# Patient Record
Sex: MALE | Race: WHITE | Employment: OTHER | URBAN - METROPOLITAN AREA
[De-identification: names, ages, dates, MRNs, and addresses within clinical notes are randomized per-mention and may not be internally consistent; named-entity substitution may affect disease eponyms.]

---

## 2020-09-24 RX ORDER — METOPROLOL SUCCINATE 25 MG/1
25 TABLET, EXTENDED RELEASE ORAL 2 TIMES DAILY
COMMUNITY

## 2020-09-24 RX ORDER — ASPIRIN 81 MG/1
81 TABLET ORAL DAILY
Status: ON HOLD | COMMUNITY
End: 2020-10-21 | Stop reason: SDUPTHER

## 2020-09-24 RX ORDER — ESCITALOPRAM OXALATE 20 MG/1
20 TABLET ORAL DAILY
COMMUNITY

## 2020-09-24 RX ORDER — ISOSORBIDE MONONITRATE 60 MG/1
60 TABLET, EXTENDED RELEASE ORAL DAILY
COMMUNITY

## 2020-09-24 RX ORDER — PRAVASTATIN SODIUM 80 MG/1
80 TABLET ORAL NIGHTLY
COMMUNITY

## 2020-09-24 RX ORDER — GABAPENTIN 600 MG/1
600 TABLET ORAL 3 TIMES DAILY
COMMUNITY

## 2020-09-24 NOTE — PROGRESS NOTES
Name_______________________________________Printed:____________________  Date and time of surgery__10/20  1145______________________Arrival Time:__0945______________   1. The instructions given regarding when and if a patient needs to stop oral intake prior to surgery varies. Follow the specific instructions you were given                  __x_Nothing to eat or to drink after Midnight the night before.                             ____Endoscopy patient follow your DRS instructions-generally you will be doing a part of the prep after Midnight                   ____Carbo loading or ERAS instructions will be given to select patients-if you have been given those instructions -please do the following                           The evening before your surgery after dinner before midnight drink 40 ounces of gatorade. If you are diabetic use sugar free. The morning of surgery drink 40 ounces of water. This needs to be finished 3 hours prior to your surgery start time. 2. Take the following pills with a small sip of water on the morning of surgery____imdur metoprolol gabapentin__ lexapro_____________________________________________                  Do not take blood pressure medications ending in pril or sartan the maritza prior to surgery or the morning of surgery_   3. Aspirin, Ibuprofen, Advil, Naproxen, Vitamin E and other Anti-inflammatory products and supplements should be stopped for 5 -7days before surgery or as directed by your physician. 4. Check with your Doctor regarding stopping Plavix, Coumadin,Eliquis, Lovenox,Effient,Pradaxa,Xarelto, Fragmin or other blood thinners and follow their instructions. 5. Do not smoke, and do not drink any alcoholic beverages 24 hours prior to surgery. This includes NA Beer. Refrain from the usage of any recreational drugs. 6. You may brush your teeth and gargle the morning of surgery. DO NOT SWALLOW WATER   7.  You MUST make arrangements for a responsible adult to stay on site plans are while the patient is in the facility. At the MAIN there is one visitor allowed. Please note that the visitor policy is subject to change._____________________________________   *Please call pre admission testing if you any further questions   [address]    [phone]  [phone]   [address] [place] [address]       All above information reviewed with patient in person or by phone. Patient verbalizes understanding. All questions and concerns addressed.                                                                                                  Patient/Rep____________________                                                                                                                Per phone/pt                    PRE OP INSTRUCTIONS

## 2020-10-14 ENCOUNTER — HOSPITAL ENCOUNTER (OUTPATIENT)
Age: 80
Discharge: HOME | End: 2020-10-14
Payer: MEDICARE

## 2020-10-14 ENCOUNTER — OFFICE VISIT (OUTPATIENT)
Dept: PRIMARY CARE CLINIC | Age: 80
End: 2020-10-14
Payer: MEDICARE

## 2020-10-14 LAB
ABO/RH: NORMAL
ANTIBODY SCREEN: NORMAL
APTT: 34.4 SEC (ref 24.2–36.2)
HCT VFR BLD CALC: 40.7 % (ref 40.5–52.5)
HEMOGLOBIN: 13.9 G/DL (ref 13.5–17.5)
INR BLD: 1.1 (ref 0.86–1.14)
MCH RBC QN AUTO: 30.8 PG (ref 26–34)
MCHC RBC AUTO-ENTMCNC: 34 G/DL (ref 31–36)
MCV RBC AUTO: 90.7 FL (ref 80–100)
PDW BLD-RTO: 13.7 % (ref 12.4–15.4)
PLATELET # BLD: 226 K/UL (ref 135–450)
PMV BLD AUTO: 7.2 FL (ref 5–10.5)
PROTHROMBIN TIME: 12.8 SEC (ref 10–13.2)
RBC # BLD: 4.49 M/UL (ref 4.2–5.9)
WBC # BLD: 6.9 K/UL (ref 4–11)

## 2020-10-14 PROCEDURE — 85027 COMPLETE CBC AUTOMATED: CPT

## 2020-10-14 PROCEDURE — 86850 RBC ANTIBODY SCREEN: CPT

## 2020-10-14 PROCEDURE — 36415 COLL VENOUS BLD VENIPUNCTURE: CPT

## 2020-10-14 PROCEDURE — 85610 PROTHROMBIN TIME: CPT

## 2020-10-14 PROCEDURE — G8428 CUR MEDS NOT DOCUMENT: HCPCS | Performed by: NURSE PRACTITIONER

## 2020-10-14 PROCEDURE — 86900 BLOOD TYPING SEROLOGIC ABO: CPT

## 2020-10-14 PROCEDURE — 85730 THROMBOPLASTIN TIME PARTIAL: CPT

## 2020-10-14 PROCEDURE — 86901 BLOOD TYPING SEROLOGIC RH(D): CPT

## 2020-10-14 PROCEDURE — 99211 OFF/OP EST MAY X REQ PHY/QHP: CPT | Performed by: NURSE PRACTITIONER

## 2020-10-14 PROCEDURE — G8419 CALC BMI OUT NRM PARAM NOF/U: HCPCS | Performed by: NURSE PRACTITIONER

## 2020-10-14 NOTE — PATIENT INSTRUCTIONS

## 2020-10-14 NOTE — PROGRESS NOTES
Pt called to state that he fell and was seen in ER and now has arm fracture. Sheyla Cunningham at Dr. Sergei Spencer office notified and will let him know and get back with pt. No further orders given.

## 2020-10-14 NOTE — PROGRESS NOTES
Daniel Milian received a viral test for COVID-19. They were educated on isolation and quarantine as appropriate. For any symptoms, they were directed to seek care from their PCP, given contact information to establish with a doctor, directed to an urgent care or the emergency room.

## 2020-10-15 LAB — SARS-COV-2, NAA: NOT DETECTED

## 2020-10-15 NOTE — PROGRESS NOTES
Message left with wife for new arrival time of 0730. [facility] called multiple times to extract ekg, but thus far unsuccessful as of the time of this writing. Dr. Julian Montaño made aware and would like ekg dos if ekg does not arrive. No further orders given.

## 2020-10-15 NOTE — RESULT ENCOUNTER NOTE

## 2020-10-20 ENCOUNTER — ANESTHESIA (OUTPATIENT)
Dept: OPERATING ROOM | Age: 80
End: 2020-10-20
Payer: MEDICARE

## 2020-10-20 ENCOUNTER — HOSPITAL ENCOUNTER (OUTPATIENT)
Age: 80
Setting detail: OBSERVATION
Discharge: HOME | End: 2020-10-22
Attending: NEUROLOGICAL SURGERY | Admitting: NEUROLOGICAL SURGERY
Payer: MEDICARE

## 2020-10-20 ENCOUNTER — ANESTHESIA EVENT (OUTPATIENT)
Dept: OPERATING ROOM | Age: 80
End: 2020-10-20
Payer: MEDICARE

## 2020-10-20 ENCOUNTER — APPOINTMENT (OUTPATIENT)
Dept: GENERAL RADIOLOGY | Age: 80
End: 2020-10-20
Attending: NEUROLOGICAL SURGERY
Payer: MEDICARE

## 2020-10-20 VITALS
OXYGEN SATURATION: 100 % | SYSTOLIC BLOOD PRESSURE: 132 MMHG | TEMPERATURE: 96.1 F | RESPIRATION RATE: 12 BRPM | DIASTOLIC BLOOD PRESSURE: 63 MMHG

## 2020-10-20 PROBLEM — K21.9 GERD (GASTROESOPHAGEAL REFLUX DISEASE): Status: ACTIVE | Noted: 2020-10-20

## 2020-10-20 PROBLEM — F32.A DEPRESSION: Status: ACTIVE | Noted: 2020-10-20

## 2020-10-20 PROBLEM — M48.04 SPINAL STENOSIS, THORACIC REGION: Status: ACTIVE | Noted: 2020-10-20

## 2020-10-20 PROBLEM — E78.00 HIGH CHOLESTEROL: Status: ACTIVE | Noted: 2020-10-20

## 2020-10-20 LAB
ABO/RH: NORMAL
ANTIBODY SCREEN: NORMAL

## 2020-10-20 PROCEDURE — 3700000000 HC ANESTHESIA ATTENDED CARE: Performed by: NEUROLOGICAL SURGERY

## 2020-10-20 PROCEDURE — 36415 COLL VENOUS BLD VENIPUNCTURE: CPT

## 2020-10-20 PROCEDURE — 72020 X-RAY EXAM OF SPINE 1 VIEW: CPT

## 2020-10-20 PROCEDURE — 1200000000 HC SEMI PRIVATE

## 2020-10-20 PROCEDURE — 3600000004 HC SURGERY LEVEL 4 BASE: Performed by: NEUROLOGICAL SURGERY

## 2020-10-20 PROCEDURE — 6360000002 HC RX W HCPCS: Performed by: NEUROLOGICAL SURGERY

## 2020-10-20 PROCEDURE — 94761 N-INVAS EAR/PLS OXIMETRY MLT: CPT

## 2020-10-20 PROCEDURE — 3600000014 HC SURGERY LEVEL 4 ADDTL 15MIN: Performed by: NEUROLOGICAL SURGERY

## 2020-10-20 PROCEDURE — 2580000003 HC RX 258: Performed by: NURSE ANESTHETIST, CERTIFIED REGISTERED

## 2020-10-20 PROCEDURE — 7100000000 HC PACU RECOVERY - FIRST 15 MIN: Performed by: NEUROLOGICAL SURGERY

## 2020-10-20 PROCEDURE — 7100000001 HC PACU RECOVERY - ADDTL 15 MIN: Performed by: NEUROLOGICAL SURGERY

## 2020-10-20 PROCEDURE — 3700000001 HC ADD 15 MINUTES (ANESTHESIA): Performed by: NEUROLOGICAL SURGERY

## 2020-10-20 PROCEDURE — 6360000002 HC RX W HCPCS: Performed by: NURSE ANESTHETIST, CERTIFIED REGISTERED

## 2020-10-20 PROCEDURE — 2720000010 HC SURG SUPPLY STERILE: Performed by: NEUROLOGICAL SURGERY

## 2020-10-20 PROCEDURE — 6370000000 HC RX 637 (ALT 250 FOR IP): Performed by: NEUROLOGICAL SURGERY

## 2020-10-20 PROCEDURE — 94150 VITAL CAPACITY TEST: CPT

## 2020-10-20 PROCEDURE — G0378 HOSPITAL OBSERVATION PER HR: HCPCS

## 2020-10-20 PROCEDURE — 2500000003 HC RX 250 WO HCPCS: Performed by: NEUROLOGICAL SURGERY

## 2020-10-20 PROCEDURE — 86850 RBC ANTIBODY SCREEN: CPT

## 2020-10-20 PROCEDURE — 2700000000 HC OXYGEN THERAPY PER DAY

## 2020-10-20 PROCEDURE — 86901 BLOOD TYPING SEROLOGIC RH(D): CPT

## 2020-10-20 PROCEDURE — 2709999900 HC NON-CHARGEABLE SUPPLY: Performed by: NEUROLOGICAL SURGERY

## 2020-10-20 PROCEDURE — 2580000003 HC RX 258: Performed by: NEUROLOGICAL SURGERY

## 2020-10-20 PROCEDURE — 86900 BLOOD TYPING SEROLOGIC ABO: CPT

## 2020-10-20 PROCEDURE — 2500000003 HC RX 250 WO HCPCS: Performed by: NURSE ANESTHETIST, CERTIFIED REGISTERED

## 2020-10-20 PROCEDURE — 6370000000 HC RX 637 (ALT 250 FOR IP): Performed by: ANESTHESIOLOGY

## 2020-10-20 RX ORDER — GABAPENTIN 300 MG/1
600 CAPSULE ORAL 3 TIMES DAILY
Status: DISCONTINUED | OUTPATIENT
Start: 2020-10-20 | End: 2020-10-22 | Stop reason: HOSPADM

## 2020-10-20 RX ORDER — HYDROMORPHONE HCL 110MG/55ML
0.5 PATIENT CONTROLLED ANALGESIA SYRINGE INTRAVENOUS EVERY 5 MIN PRN
Status: DISCONTINUED | OUTPATIENT
Start: 2020-10-20 | End: 2020-10-20 | Stop reason: HOSPADM

## 2020-10-20 RX ORDER — DEXAMETHASONE SODIUM PHOSPHATE 4 MG/ML
INJECTION, SOLUTION INTRA-ARTICULAR; INTRALESIONAL; INTRAMUSCULAR; INTRAVENOUS; SOFT TISSUE PRN
Status: DISCONTINUED | OUTPATIENT
Start: 2020-10-20 | End: 2020-10-20 | Stop reason: SDUPTHER

## 2020-10-20 RX ORDER — ONDANSETRON 2 MG/ML
4 INJECTION INTRAMUSCULAR; INTRAVENOUS EVERY 6 HOURS PRN
Status: DISCONTINUED | OUTPATIENT
Start: 2020-10-20 | End: 2020-10-22 | Stop reason: HOSPADM

## 2020-10-20 RX ORDER — HYDROCODONE BITARTRATE AND ACETAMINOPHEN 5; 325 MG/1; MG/1
1 TABLET ORAL
Status: DISCONTINUED | OUTPATIENT
Start: 2020-10-20 | End: 2020-10-20 | Stop reason: HOSPADM

## 2020-10-20 RX ORDER — METOPROLOL SUCCINATE 25 MG/1
25 TABLET, EXTENDED RELEASE ORAL 2 TIMES DAILY
Status: DISCONTINUED | OUTPATIENT
Start: 2020-10-20 | End: 2020-10-22 | Stop reason: HOSPADM

## 2020-10-20 RX ORDER — LIDOCAINE HYDROCHLORIDE 10 MG/ML
0.5 INJECTION, SOLUTION EPIDURAL; INFILTRATION; INTRACAUDAL; PERINEURAL ONCE
Status: DISCONTINUED | OUTPATIENT
Start: 2020-10-20 | End: 2020-10-20 | Stop reason: HOSPADM

## 2020-10-20 RX ORDER — PRAVASTATIN SODIUM 80 MG/1
80 TABLET ORAL NIGHTLY
Status: DISCONTINUED | OUTPATIENT
Start: 2020-10-20 | End: 2020-10-22 | Stop reason: HOSPADM

## 2020-10-20 RX ORDER — MAGNESIUM HYDROXIDE 1200 MG/15ML
LIQUID ORAL CONTINUOUS PRN
Status: COMPLETED | OUTPATIENT
Start: 2020-10-20 | End: 2020-10-20

## 2020-10-20 RX ORDER — PROMETHAZINE HYDROCHLORIDE 25 MG/1
12.5 TABLET ORAL EVERY 6 HOURS PRN
Status: DISCONTINUED | OUTPATIENT
Start: 2020-10-20 | End: 2020-10-22 | Stop reason: HOSPADM

## 2020-10-20 RX ORDER — ISOSORBIDE MONONITRATE 30 MG/1
60 TABLET, EXTENDED RELEASE ORAL DAILY
Status: DISCONTINUED | OUTPATIENT
Start: 2020-10-20 | End: 2020-10-22 | Stop reason: HOSPADM

## 2020-10-20 RX ORDER — 0.9 % SODIUM CHLORIDE 0.9 %
500 INTRAVENOUS SOLUTION INTRAVENOUS PRN
Status: DISCONTINUED | OUTPATIENT
Start: 2020-10-20 | End: 2020-10-22 | Stop reason: HOSPADM

## 2020-10-20 RX ORDER — PHENYLEPHRINE HCL IN 0.9% NACL 1 MG/10 ML
SYRINGE (ML) INTRAVENOUS PRN
Status: DISCONTINUED | OUTPATIENT
Start: 2020-10-20 | End: 2020-10-20 | Stop reason: SDUPTHER

## 2020-10-20 RX ORDER — SODIUM CHLORIDE, SODIUM LACTATE, POTASSIUM CHLORIDE, CALCIUM CHLORIDE 600; 310; 30; 20 MG/100ML; MG/100ML; MG/100ML; MG/100ML
INJECTION, SOLUTION INTRAVENOUS CONTINUOUS PRN
Status: DISCONTINUED | OUTPATIENT
Start: 2020-10-20 | End: 2020-10-20 | Stop reason: SDUPTHER

## 2020-10-20 RX ORDER — HYDROMORPHONE HCL 110MG/55ML
0.25 PATIENT CONTROLLED ANALGESIA SYRINGE INTRAVENOUS EVERY 5 MIN PRN
Status: DISCONTINUED | OUTPATIENT
Start: 2020-10-20 | End: 2020-10-20 | Stop reason: HOSPADM

## 2020-10-20 RX ORDER — OXYCODONE HYDROCHLORIDE 5 MG/1
10 TABLET ORAL EVERY 4 HOURS PRN
Status: DISCONTINUED | OUTPATIENT
Start: 2020-10-20 | End: 2020-10-22 | Stop reason: HOSPADM

## 2020-10-20 RX ORDER — LIDOCAINE HYDROCHLORIDE 20 MG/ML
INJECTION, SOLUTION EPIDURAL; INFILTRATION; INTRACAUDAL; PERINEURAL PRN
Status: DISCONTINUED | OUTPATIENT
Start: 2020-10-20 | End: 2020-10-20 | Stop reason: SDUPTHER

## 2020-10-20 RX ORDER — HYDROMORPHONE HYDROCHLORIDE 1 MG/ML
0.5 INJECTION, SOLUTION INTRAMUSCULAR; INTRAVENOUS; SUBCUTANEOUS
Status: DISCONTINUED | OUTPATIENT
Start: 2020-10-20 | End: 2020-10-22 | Stop reason: HOSPADM

## 2020-10-20 RX ORDER — SODIUM CHLORIDE, SODIUM LACTATE, POTASSIUM CHLORIDE, CALCIUM CHLORIDE 600; 310; 30; 20 MG/100ML; MG/100ML; MG/100ML; MG/100ML
INJECTION, SOLUTION INTRAVENOUS CONTINUOUS
Status: DISCONTINUED | OUTPATIENT
Start: 2020-10-20 | End: 2020-10-20

## 2020-10-20 RX ORDER — MAGNESIUM SULFATE HEPTAHYDRATE 500 MG/ML
INJECTION, SOLUTION INTRAMUSCULAR; INTRAVENOUS PRN
Status: DISCONTINUED | OUTPATIENT
Start: 2020-10-20 | End: 2020-10-20 | Stop reason: SDUPTHER

## 2020-10-20 RX ORDER — METHOCARBAMOL 750 MG/1
750 TABLET, FILM COATED ORAL EVERY 8 HOURS PRN
Status: DISCONTINUED | OUTPATIENT
Start: 2020-10-20 | End: 2020-10-22 | Stop reason: HOSPADM

## 2020-10-20 RX ORDER — SODIUM CHLORIDE 9 MG/ML
INJECTION, SOLUTION INTRAVENOUS CONTINUOUS
Status: DISCONTINUED | OUTPATIENT
Start: 2020-10-20 | End: 2020-10-20

## 2020-10-20 RX ORDER — PROPOFOL 10 MG/ML
INJECTION, EMULSION INTRAVENOUS CONTINUOUS PRN
Status: DISCONTINUED | OUTPATIENT
Start: 2020-10-20 | End: 2020-10-20 | Stop reason: SDUPTHER

## 2020-10-20 RX ORDER — FENTANYL CITRATE 50 UG/ML
INJECTION, SOLUTION INTRAMUSCULAR; INTRAVENOUS PRN
Status: DISCONTINUED | OUTPATIENT
Start: 2020-10-20 | End: 2020-10-20 | Stop reason: SDUPTHER

## 2020-10-20 RX ORDER — PANTOPRAZOLE SODIUM 20 MG/1
20 TABLET, DELAYED RELEASE ORAL DAILY
COMMUNITY

## 2020-10-20 RX ORDER — POTASSIUM CHLORIDE 7.45 MG/ML
10 INJECTION INTRAVENOUS PRN
Status: DISCONTINUED | OUTPATIENT
Start: 2020-10-20 | End: 2020-10-22 | Stop reason: HOSPADM

## 2020-10-20 RX ORDER — LIDOCAINE HYDROCHLORIDE 10 MG/ML
1 INJECTION, SOLUTION EPIDURAL; INFILTRATION; INTRACAUDAL; PERINEURAL
Status: DISCONTINUED | OUTPATIENT
Start: 2020-10-20 | End: 2020-10-20 | Stop reason: HOSPADM

## 2020-10-20 RX ORDER — ONDANSETRON 2 MG/ML
INJECTION INTRAMUSCULAR; INTRAVENOUS PRN
Status: DISCONTINUED | OUTPATIENT
Start: 2020-10-20 | End: 2020-10-20 | Stop reason: SDUPTHER

## 2020-10-20 RX ORDER — POLYETHYLENE GLYCOL 3350 17 G/17G
17 POWDER, FOR SOLUTION ORAL DAILY
Status: DISCONTINUED | OUTPATIENT
Start: 2020-10-20 | End: 2020-10-22 | Stop reason: HOSPADM

## 2020-10-20 RX ORDER — BUPIVACAINE HYDROCHLORIDE 5 MG/ML
INJECTION, SOLUTION EPIDURAL; INTRACAUDAL
Status: COMPLETED | OUTPATIENT
Start: 2020-10-20 | End: 2020-10-20

## 2020-10-20 RX ORDER — SUCCINYLCHOLINE/SOD CL,ISO/PF 200MG/10ML
SYRINGE (ML) INTRAVENOUS PRN
Status: DISCONTINUED | OUTPATIENT
Start: 2020-10-20 | End: 2020-10-20 | Stop reason: SDUPTHER

## 2020-10-20 RX ORDER — ONDANSETRON 2 MG/ML
4 INJECTION INTRAMUSCULAR; INTRAVENOUS
Status: DISCONTINUED | OUTPATIENT
Start: 2020-10-20 | End: 2020-10-20 | Stop reason: HOSPADM

## 2020-10-20 RX ORDER — ESCITALOPRAM OXALATE 10 MG/1
20 TABLET ORAL DAILY
Status: DISCONTINUED | OUTPATIENT
Start: 2020-10-20 | End: 2020-10-22 | Stop reason: HOSPADM

## 2020-10-20 RX ORDER — SODIUM CHLORIDE 0.9 % (FLUSH) 0.9 %
10 SYRINGE (ML) INJECTION EVERY 12 HOURS SCHEDULED
Status: DISCONTINUED | OUTPATIENT
Start: 2020-10-20 | End: 2020-10-22 | Stop reason: HOSPADM

## 2020-10-20 RX ORDER — PROPOFOL 10 MG/ML
INJECTION, EMULSION INTRAVENOUS PRN
Status: DISCONTINUED | OUTPATIENT
Start: 2020-10-20 | End: 2020-10-20 | Stop reason: SDUPTHER

## 2020-10-20 RX ORDER — POTASSIUM CHLORIDE 20 MEQ/1
40 TABLET, EXTENDED RELEASE ORAL PRN
Status: DISCONTINUED | OUTPATIENT
Start: 2020-10-20 | End: 2020-10-22 | Stop reason: HOSPADM

## 2020-10-20 RX ORDER — LABETALOL HYDROCHLORIDE 5 MG/ML
10 INJECTION, SOLUTION INTRAVENOUS EVERY 6 HOURS PRN
Status: DISCONTINUED | OUTPATIENT
Start: 2020-10-20 | End: 2020-10-22 | Stop reason: HOSPADM

## 2020-10-20 RX ORDER — DEXTROSE, SODIUM CHLORIDE, AND POTASSIUM CHLORIDE 5; .45; .15 G/100ML; G/100ML; G/100ML
1000 INJECTION INTRAVENOUS CONTINUOUS
Status: DISCONTINUED | OUTPATIENT
Start: 2020-10-20 | End: 2020-10-21

## 2020-10-20 RX ORDER — OXYCODONE HYDROCHLORIDE 5 MG/1
5 TABLET ORAL EVERY 4 HOURS PRN
Status: DISCONTINUED | OUTPATIENT
Start: 2020-10-20 | End: 2020-10-22 | Stop reason: HOSPADM

## 2020-10-20 RX ORDER — ACETAMINOPHEN 325 MG/1
650 TABLET ORAL ONCE
Status: COMPLETED | OUTPATIENT
Start: 2020-10-20 | End: 2020-10-20

## 2020-10-20 RX ORDER — SODIUM CHLORIDE 0.9 % (FLUSH) 0.9 %
10 SYRINGE (ML) INJECTION PRN
Status: DISCONTINUED | OUTPATIENT
Start: 2020-10-20 | End: 2020-10-22 | Stop reason: HOSPADM

## 2020-10-20 RX ADMIN — PROPOFOL 30 MG: 10 INJECTION, EMULSION INTRAVENOUS at 09:25

## 2020-10-20 RX ADMIN — GABAPENTIN 600 MG: 300 CAPSULE ORAL at 15:45

## 2020-10-20 RX ADMIN — PROPOFOL 50 MG: 10 INJECTION, EMULSION INTRAVENOUS at 09:32

## 2020-10-20 RX ADMIN — PHENYLEPHRINE HYDROCHLORIDE 40 MCG/MIN: 10 INJECTION INTRAVENOUS at 09:44

## 2020-10-20 RX ADMIN — Medication 3 G: at 16:07

## 2020-10-20 RX ADMIN — POLYETHYLENE GLYCOL 3350 17 G: 17 POWDER, FOR SOLUTION ORAL at 15:49

## 2020-10-20 RX ADMIN — FENTANYL CITRATE 50 MCG: 50 INJECTION INTRAMUSCULAR; INTRAVENOUS at 09:23

## 2020-10-20 RX ADMIN — DEXAMETHASONE SODIUM PHOSPHATE 10 MG: 4 INJECTION, SOLUTION INTRAMUSCULAR; INTRAVENOUS at 09:23

## 2020-10-20 RX ADMIN — Medication 3 G: at 23:51

## 2020-10-20 RX ADMIN — GABAPENTIN 600 MG: 300 CAPSULE ORAL at 20:13

## 2020-10-20 RX ADMIN — PROPOFOL 120 MG: 10 INJECTION, EMULSION INTRAVENOUS at 09:23

## 2020-10-20 RX ADMIN — OXYCODONE 10 MG: 5 TABLET ORAL at 16:57

## 2020-10-20 RX ADMIN — PROPOFOL 50 MG: 10 INJECTION, EMULSION INTRAVENOUS at 09:30

## 2020-10-20 RX ADMIN — MAGNESIUM SULFATE HEPTAHYDRATE 1 G: 500 INJECTION, SOLUTION INTRAMUSCULAR; INTRAVENOUS at 09:50

## 2020-10-20 RX ADMIN — Medication 140 MG: at 09:23

## 2020-10-20 RX ADMIN — Medication 100 MCG: at 09:43

## 2020-10-20 RX ADMIN — Medication 100 MCG: at 09:45

## 2020-10-20 RX ADMIN — METHOCARBAMOL 500 MG: 100 INJECTION INTRAMUSCULAR; INTRAVENOUS at 11:10

## 2020-10-20 RX ADMIN — HYDROMORPHONE HYDROCHLORIDE 0.5 MG: 1 INJECTION, SOLUTION INTRAMUSCULAR; INTRAVENOUS; SUBCUTANEOUS at 18:39

## 2020-10-20 RX ADMIN — PRAVASTATIN SODIUM 80 MG: 80 TABLET ORAL at 20:13

## 2020-10-20 RX ADMIN — HYDROMORPHONE HYDROCHLORIDE 0.5 MG: 1 INJECTION, SOLUTION INTRAMUSCULAR; INTRAVENOUS; SUBCUTANEOUS at 14:53

## 2020-10-20 RX ADMIN — SODIUM CHLORIDE, POTASSIUM CHLORIDE, SODIUM LACTATE AND CALCIUM CHLORIDE: 600; 310; 30; 20 INJECTION, SOLUTION INTRAVENOUS at 09:40

## 2020-10-20 RX ADMIN — ONDANSETRON 4 MG: 2 INJECTION INTRAMUSCULAR; INTRAVENOUS at 09:23

## 2020-10-20 RX ADMIN — SODIUM CHLORIDE, POTASSIUM CHLORIDE, SODIUM LACTATE AND CALCIUM CHLORIDE: 600; 310; 30; 20 INJECTION, SOLUTION INTRAVENOUS at 09:15

## 2020-10-20 RX ADMIN — ISOSORBIDE MONONITRATE 60 MG: 30 TABLET, EXTENDED RELEASE ORAL at 15:48

## 2020-10-20 RX ADMIN — LIDOCAINE HYDROCHLORIDE 100 MG: 20 INJECTION, SOLUTION EPIDURAL; INFILTRATION; INTRACAUDAL; PERINEURAL at 09:23

## 2020-10-20 RX ADMIN — METOPROLOL SUCCINATE 25 MG: 25 TABLET, EXTENDED RELEASE ORAL at 20:13

## 2020-10-20 RX ADMIN — PROPOFOL 50 MG: 10 INJECTION, EMULSION INTRAVENOUS at 09:35

## 2020-10-20 RX ADMIN — SODIUM CHLORIDE, POTASSIUM CHLORIDE, SODIUM LACTATE AND CALCIUM CHLORIDE: 600; 310; 30; 20 INJECTION, SOLUTION INTRAVENOUS at 09:05

## 2020-10-20 RX ADMIN — ESCITALOPRAM OXALATE 20 MG: 10 TABLET ORAL at 15:48

## 2020-10-20 RX ADMIN — PROPOFOL 100 MCG/KG/MIN: 10 INJECTION, EMULSION INTRAVENOUS at 09:23

## 2020-10-20 RX ADMIN — Medication 3 G: at 09:12

## 2020-10-20 RX ADMIN — ACETAMINOPHEN 650 MG: 325 TABLET ORAL at 09:05

## 2020-10-20 RX ADMIN — POTASSIUM CHLORIDE, DEXTROSE MONOHYDRATE AND SODIUM CHLORIDE 1000 ML: 150; 5; 450 INJECTION, SOLUTION INTRAVENOUS at 12:35

## 2020-10-20 RX ADMIN — FENTANYL CITRATE 50 MCG: 50 INJECTION INTRAMUSCULAR; INTRAVENOUS at 09:32

## 2020-10-20 RX ADMIN — METHOCARBAMOL TABLETS 750 MG: 750 TABLET, COATED ORAL at 22:53

## 2020-10-20 ASSESSMENT — PULMONARY FUNCTION TESTS
PIF_VALUE: 20
PIF_VALUE: 21
PIF_VALUE: 12
PIF_VALUE: 20
PIF_VALUE: 20
PIF_VALUE: 8
PIF_VALUE: 21
PIF_VALUE: 1
PIF_VALUE: 1
PIF_VALUE: 20
PIF_VALUE: 21
PIF_VALUE: 26
PIF_VALUE: 20
PIF_VALUE: 20
PIF_VALUE: 2
PIF_VALUE: 18
PIF_VALUE: 15
PIF_VALUE: 20
PIF_VALUE: 2
PIF_VALUE: 13
PIF_VALUE: 13
PIF_VALUE: 21
PIF_VALUE: 20
PIF_VALUE: 22
PIF_VALUE: 20
PIF_VALUE: 20
PIF_VALUE: 15
PIF_VALUE: 21
PIF_VALUE: 20
PIF_VALUE: 20
PIF_VALUE: 11
PIF_VALUE: 20
PIF_VALUE: 21
PIF_VALUE: 21
PIF_VALUE: 20
PIF_VALUE: 13
PIF_VALUE: 20
PIF_VALUE: 21
PIF_VALUE: 20
PIF_VALUE: 2
PIF_VALUE: 22
PIF_VALUE: 21
PIF_VALUE: 21
PIF_VALUE: 1
PIF_VALUE: 20
PIF_VALUE: 28
PIF_VALUE: 21
PIF_VALUE: 20
PIF_VALUE: 20
PIF_VALUE: 15
PIF_VALUE: 12
PIF_VALUE: 20
PIF_VALUE: 21
PIF_VALUE: 21
PIF_VALUE: 20
PIF_VALUE: 21
PIF_VALUE: 20
PIF_VALUE: 22
PIF_VALUE: 20
PIF_VALUE: 21
PIF_VALUE: 21
PIF_VALUE: 20
PIF_VALUE: 23
PIF_VALUE: 21
PIF_VALUE: 20
PIF_VALUE: 22
PIF_VALUE: 21
PIF_VALUE: 2
PIF_VALUE: 20
PIF_VALUE: 22
PIF_VALUE: 30
PIF_VALUE: 21
PIF_VALUE: 16
PIF_VALUE: 21
PIF_VALUE: 18
PIF_VALUE: 20
PIF_VALUE: 13
PIF_VALUE: 20
PIF_VALUE: 21
PIF_VALUE: 21
PIF_VALUE: 23
PIF_VALUE: 13
PIF_VALUE: 20
PIF_VALUE: 16
PIF_VALUE: 16
PIF_VALUE: 15
PIF_VALUE: 21
PIF_VALUE: 21
PIF_VALUE: 27
PIF_VALUE: 11
PIF_VALUE: 21
PIF_VALUE: 22
PIF_VALUE: 21
PIF_VALUE: 21
PIF_VALUE: 11
PIF_VALUE: 25
PIF_VALUE: 13
PIF_VALUE: 11
PIF_VALUE: 13
PIF_VALUE: 21
PIF_VALUE: 20
PIF_VALUE: 2
PIF_VALUE: 21
PIF_VALUE: 20
PIF_VALUE: 15
PIF_VALUE: 13
PIF_VALUE: 20
PIF_VALUE: 6
PIF_VALUE: 20
PIF_VALUE: 16
PIF_VALUE: 20
PIF_VALUE: 21
PIF_VALUE: 20
PIF_VALUE: 21
PIF_VALUE: 19
PIF_VALUE: 21
PIF_VALUE: 20
PIF_VALUE: 2
PIF_VALUE: 2
PIF_VALUE: 20
PIF_VALUE: 20
PIF_VALUE: 12
PIF_VALUE: 21
PIF_VALUE: 21
PIF_VALUE: 17
PIF_VALUE: 14
PIF_VALUE: 21
PIF_VALUE: 20
PIF_VALUE: 20
PIF_VALUE: 21
PIF_VALUE: 21
PIF_VALUE: 20
PIF_VALUE: 20
PIF_VALUE: 16

## 2020-10-20 ASSESSMENT — PAIN DESCRIPTION - DESCRIPTORS
DESCRIPTORS: ACHING

## 2020-10-20 ASSESSMENT — PAIN DESCRIPTION - LOCATION
LOCATION: BACK

## 2020-10-20 ASSESSMENT — PAIN SCALES - GENERAL
PAINLEVEL_OUTOF10: 7
PAINLEVEL_OUTOF10: 7
PAINLEVEL_OUTOF10: 0
PAINLEVEL_OUTOF10: 5
PAINLEVEL_OUTOF10: 5
PAINLEVEL_OUTOF10: 7

## 2020-10-20 ASSESSMENT — PAIN - FUNCTIONAL ASSESSMENT: PAIN_FUNCTIONAL_ASSESSMENT: 0-10

## 2020-10-20 ASSESSMENT — PAIN DESCRIPTION - PAIN TYPE
TYPE: CHRONIC PAIN
TYPE: CHRONIC PAIN;SURGICAL PAIN

## 2020-10-20 ASSESSMENT — PAIN DESCRIPTION - ORIENTATION
ORIENTATION: LOWER

## 2020-10-20 NOTE — ANESTHESIA POSTPROCEDURE EVALUATION
Department of Anesthesiology  Postprocedure Note    Patient: Thaddeus Morgan  MRN: [id number]  YOB: 1940  Date of evaluation: 10/20/2020  Time:  11:50 AM     Procedure Summary     Date:  10/20/20 Room / Location:  [address]    Anesthesia Start:  1495 Anesthesia Stop:      Procedure:  [address] ([address], [address]) EVOKES (N/A ) Diagnosis:  (M48.04  THORACIC STENOSIS T1-T12)    Surgeon:  Corazon Ramos MD Responsible Provider:  Viji Alvares MD    Anesthesia Type:  general ASA Status:  3          Anesthesia Type: general    Valentin Phase I: Valentin Score: 10    Valentin Phase II:      Last vitals: Reviewed and per EMR flowsheets.        Anesthesia Post Evaluation    Patient location during evaluation: PACU  Patient participation: complete - patient participated  Level of consciousness: awake  Airway patency: patent  Nausea & Vomiting: no vomiting  Complications: no  Cardiovascular status: hemodynamically stable  Respiratory status: acceptable  Hydration status: euvolemic

## 2020-10-20 NOTE — PROGRESS NOTES
Room clean on 4T. Pt resting quietly in bed with eyes closed, awakens to voice. VSS, O2 sats 92% on room air. Will transfer pt to 4T.

## 2020-10-20 NOTE — PROGRESS NOTES
Pt arrived from OR to PACU, arouses to stimuli. VSS, O2 sats 96% on 6 L simple mask. Dressing on upper back dry and intact, MICKEY drain in place with small amount of sanguinous drainage. Neuro assessment WDL. Will monitor.

## 2020-10-20 NOTE — PROGRESS NOTES
Generalized bruising and abrasions noted from fall last week. Bruising and abrasions noted to left side of face around eye. Red sclera evident. Bandaids on bilateral knees and left elbow. Cast intact on left forearm. Rothman catheter placement with issue during attempted placement of 16 fr catheter. Possible urethral stricture present. 14 fr catheter successfully inserted by Maia Canales SA. No evidence of swelling noted.    FRANC BraggN, RN, [employer]

## 2020-10-20 NOTE — PROGRESS NOTES
Date of Surgery Update:  Macey Varela was seen, history and physical examination reviewed, and patient examined by me today. There have been no significant clinical changes since the completion of the previous history and physical.    The risk, benefits, and alternatives of the proposed procedure have been explained to the patient (or appropriate guardian) and understanding verbalized. All questions answered. Patient wishes to proceed.     Electronically signed by: Ambreen Guzman MD,10/20/2020,8:58 AM Need for prophylactic measure

## 2020-10-20 NOTE — ANESTHESIA PRE PROCEDURE
Department of Anesthesiology  Preprocedure Note       Name:  Zulema Mejia   Age:  79 y.o.  :  1940                                          MRN:  [id number]         Date:  10/20/2020      Surgeon: Denise Correa):  Severiano Speak, MD    Procedure: Procedure(s):  [address] ([address], [address]) [place]    Medications prior to admission:   Prior to Admission medications    Medication Sig Start Date End Date Taking? Authorizing Provider   isosorbide mononitrate (IMDUR) 60 MG extended release tablet Take 60 mg by mouth daily   Yes Historical Provider, MD   gabapentin (NEURONTIN) 600 MG tablet Take 600 mg by mouth 3 times daily.    Yes Historical Provider, MD   escitalopram (LEXAPRO) 20 MG tablet Take 20 mg by mouth daily   Yes Historical Provider, MD   metoprolol succinate (TOPROL XL) 25 MG extended release tablet Take 25 mg by mouth 2 times daily   Yes Historical Provider, MD   pravastatin (PRAVACHOL) 80 MG tablet Take 80 mg by mouth nightly   Yes Historical Provider, MD   aspirin 81 MG EC tablet Take 81 mg by mouth daily   Yes Historical Provider, MD       Current medications:    Current Facility-Administered Medications   Medication Dose Route Frequency Provider Last Rate Last Dose    lactated ringers infusion   Intravenous Continuous Severiano Speak, MD        lidocaine PF 1 % injection 0.5 mL  0.5 mL Intradermal Once Severiano Speak, MD        ceFAZolin (ANCEF) 3 g in dextrose 5 % 100 mL IVPB  3 g Intravenous On Call to [address] [place] [address], MD        HYDROcodone-acetaminophen (NORCO) 5-325 MG per tablet 1 tablet  1 tablet Oral Once PRN Rich Pagan MD        ondansetron [facility]) injection 4 mg  4 mg Intravenous Once PRN Rich Pagan MD        HYDROmorphone (DILAUDID) injection 0.25 mg  0.25 mg Intravenous Q5 Min PRN Rich Pagan MD        HYDROmorphone (DILAUDID) injection 0.5 mg  0.5 mg Intravenous Q5 Min PRN Rich Pagan MD           Allergies:  No Known Allergies    Problem List:  There is no problem list on file for this patient. Past Medical History:        Diagnosis Date    CAD (coronary artery disease)     Cancer ([address]     bladder    Hyperlipidemia     Hypertension     Sleep apnea     cpap       Past Surgical History:        Procedure Laterality Date    BACK SURGERY      x5    BLADDER SURGERY      for cancer    CARDIAC SURGERY      strnts last time 11/2018    JOINT REPLACEMENT      hip right       Social History:    Social History     Tobacco Use    Smoking status: Former Smoker    Smokeless tobacco: Never Used    Tobacco comment: [id number]   Substance Use Topics    Alcohol use: Not Currently     Comment: [id number]                                Counseling given: Not Answered  Comment: [id number]      Vital Signs (Current):   Vitals:    09/24/20 1347   Weight: 265 lb (120.2 kg)   Height: 6' 4\" (1.93 m)                                              BP Readings from Last 3 Encounters:   No data found for BP       NPO Status:                                                                                 BMI:   Wt Readings from Last 3 Encounters:   09/24/20 265 lb (120.2 kg)     Body mass index is 32.26 kg/m². CBC:   Lab Results   Component Value Date    WBC 6.9 10/14/2020    RBC 4.49 10/14/2020    HGB 13.9 10/14/2020    HCT 40.7 10/14/2020    MCV 90.7 10/14/2020    RDW 13.7 10/14/2020     10/14/2020       CMP: No results found for: NA, K, CL, CO2, BUN, CREATININE, GFRAA, AGRATIO, LABGLOM, GLUCOSE, PROT, CALCIUM, BILITOT, ALKPHOS, AST, ALT    POC Tests: No results for input(s): POCGLU, POCNA, POCK, POCCL, POCBUN, POCHEMO, POCHCT in the last 72 hours.     Coags:   Lab Results   Component Value Date    PROTIME 12.8 10/14/2020    INR 1.10 10/14/2020    APTT 34.4 10/14/2020       HCG (If Applicable): No results found for: PREGTESTUR, PREGSERUM, HCG, HCGQUANT     ABGs: No results found for: PHART, PO2ART, ZDZ4FNG, IOG8DRV, BEART, G3PUREFA     Type & Screen (If Applicable):  No results found for: LABABO, LABRH    Drug/Infectious Status (If Applicable):  No results found for: HIV, HEPCAB    COVID-19 Screening (If Applicable):   Lab Results   Component Value Date    COVID19 NOT DETECTED 10/14/2020         Anesthesia Evaluation  Patient summary reviewed no history of anesthetic complications:   Airway: Mallampati: II  TM distance: >3 FB   Neck ROM: full  Mouth opening: > = 3 FB Dental:      Comment: Missing top right incisor. Chipped top left incisor    Pulmonary:   (+) sleep apnea: on CPAP,      (-) rhonchi and wheezes                          ROS comment: Former smoker   Cardiovascular:    (+) CAD:, CABG/stent (PTCA 92, 94, 2018):, hyperlipidemia        Rhythm: regular  Rate: normal  Echocardiogram reviewed  Stress test reviewed  Cleared by cardiology     Beta Blocker:  Dose within 24 Hrs      ROS comment: 2019  2D Echo: 1/9/2019  1. Left ventricle: The cavity size was normal. Wall thickness was     normal. Systolic function was normal. The calculated Ejection     Fraction is 57%. Wall motion was normal; there were no regional     wall motion abnormalities. Early diastolic septal annular tissue     Doppler velocities Ea were abnormal. Doppler parameters are     consistent with abnormal left ventricular relaxation (grade 1     diastolic dysfunction). 2. Aortic valve: There was mild regurgitation. 3. Ascending aorta: The ascending aorta was mildly dilatedmeasuring     4.39cm. 4. Left atrium: The atrium was moderately dilated. The estimated     left atrial pressure is 9mm Hg. 5. Right ventricle: The cavity size was normal. Wall thickness was     normal. Systolic function was normal.  6. Right atrium: The atrium was moderately dilated.     3/20 lexiscan stress no ischemia    Orthostatic hypotension  Moderate risk per cardiology note  Incomplete RBBB    Patient denies new cardiac symptoms since seeing cardiologist.     Neuro/Psych:      (-) seizures, TIA and

## 2020-10-20 NOTE — PROGRESS NOTES
Pt to floor from PACU. Pt is alert and oriented x4. Pt c/o severe pain- PRN dilaudid given. MIKCEY draining serosanguinous fluid, insertion site is CDI on back. Neuro checks are WNL. The care plan and education has been reviewed and mutually agreed upon with the patient. Room free from clutter. All fall precautions in place. Bed in lowest position with wheels locked and alarm on, call light and belongings within reach, and door open. Will continue to monitor.

## 2020-10-20 NOTE — PROGRESS NOTES
Pt resting quietly in bed with eyes closed, awakens to voice, states his pain is tolerable for him at this time, unable to rate and states \"he doesn't know\" but denies pain medication at this time. VSS, O2 sats 99% on 3 L NC. Dressing to back CDI, MICKEY drain remains in place. Neuro assessment WDL. Rothman in place draining clear yellow urine. Pt seen by anesthesia, phase 1 criteria met. Awaiting bed to be cleaned on 4T. Will monitor and transfer pt when room ready.

## 2020-10-20 NOTE — CONSULTS
Consult -Internal Medicine  Dr. Nguyễn Coma  10/20/2020      PCP: No primary care provider on file. Referring Physician: Danuta Stewart MD    Code Status: Full Code  Current Diet: DIET GENERAL;      Cc: Alina Aviles is a79 y.o. male who presents with Spinal stenosis, thoracic region. Active Hospital Problems    Diagnosis Date Noted    Spinal stenosis, thoracic region [M48.04] 10/20/2020    GERD (gastroesophageal reflux disease) [K21.9] 10/20/2020    Depression [F32.9] 10/20/2020    High cholesterol [E78.00] 10/20/2020     HPI: Alina Aviles has been admitted by Danuta Stewart MD with lower back pain, leg weakness. Pt is 80yo M with intractable back pain assoc with leg weakness. Pain is constant, rated 6/10. Aching in nature. Nothing makes it better or worse. Assoc with leg weakness which is so severe tht it limits his usual activities. MRI is reviewed and it shows T10-11 stenosis. As conservative measures have not improved sx, surgery is recommended    Pt has undergone Bilateral microscopic decompressive laminectomy T10-11 with bilateral medial facetectomies and  nerve root foraminotomies without any apparentcomplications. Pt is doing well post operatively. Pain is controlled at this time. I have been asked to see the patient for evaluation of his internal medicine issues:  has a past medical history of CAD (coronary artery disease), Cancer ([address] Hyperlipidemia, Hypertension, and Sleep apnea. .  Home meds have been reveiwed and restartedas indicated. Pt denies having other complaints at this time. Doing ok post op  Seen in recovery  Pt still somewhat sedated  Offers no complaints  Pain appears well controlled    Electronic chart reviewed  Home meds reviewed and restarted as indicated  Op note, anesthesia flowsheet reviewed  Case d/w nursing       Problem list of hospitalization thus far:   Active Hospital Problems    Diagnosis    Spinal stenosis, thoracic region [M48.04]    GERD (gastroesophageal reflux disease) [K21.9]    Depression [F32.9]    High cholesterol [E78.00]         Review of Systems: (1 system for EPF, 2-9 for detailed, 10+ for comprehensive)  Review of Systems   Unable to perform ROS: Other    (sedated)         Past Medical History:   Past Medical History:   Diagnosis Date    CAD (coronary artery disease)     Cancer ([address]     bladder    Hyperlipidemia     Hypertension     Sleep apnea     cpap       Past Surgical History:   Past Surgical History:   Procedure Laterality Date    BACK SURGERY      x5    BLADDER SURGERY      for cancer    CARDIAC SURGERY      strnts last time 11/2018    JOINT REPLACEMENT      hip right    LAMINECTOMY POSTERIOR THORACIC / LUMBAR N/A 10/20/2020    DECOMPRESSIVE LAMINECTOMY [id number] ([address], [address]) EVOKES performed by Erin Lobo MD at [address] [place] [address] [employer] History:   Social History     Tobacco History     Smoking Status  Former Smoker    Smokeless Tobacco Use  Never Used    Tobacco Comment  [id number]          Alcohol History     Alcohol Use Status  Not Currently Comment  [id number]          Drug Use     Drug Use Status  Never          Sexual Activity     Sexually Active  Not Asked                Fam History: History reviewed. No pertinent family history. PFSH: The above PMHx, PSHx, SocHx, FamHx has been reviewed by myself. (1 area for detailed, 2-3 for comprehensive)      Code Status: Full Code    Meds - following list ofhome medications from electronic chart has been reviewed by myself  Prior to Admission medications    Medication Sig Start Date End Date Taking? Authorizing Provider   pantoprazole (PROTONIX) 20 MG tablet Take 20 mg by mouth daily   Yes Historical Provider, MD   isosorbide mononitrate (IMDUR) 60 MG extended release tablet Take 60 mg by mouth daily   Yes Historical Provider, MD   gabapentin (NEURONTIN) 600 MG tablet Take 600 mg by mouth 3 times daily.    Yes Historical Provider, MD   escitalopram (LEXAPRO) 20 MG tablet Take 20 mg by mouth daily   Yes Historical Provider, MD   metoprolol succinate (TOPROL XL) 25 MG extended release tablet Take 25 mg by mouth 2 times daily   Yes Historical Provider, MD   pravastatin (PRAVACHOL) 80 MG tablet Take 80 mg by mouth nightly   Yes Historical Provider, MD   aspirin 81 MG EC tablet Take 81 mg by mouth daily   Yes Historical Provider, MD         No Known Allergies          EXAM: (2-7 system forEPF/Detailed, ?8 for Comprehensive)  /69   Pulse 80   Temp 98.1 °F (36.7 °C) (Oral)   Resp 16   Ht 6' 4\" (1.93 m)   Wt 270 lb 12.8 oz (122.8 kg)   SpO2 94%   BMI 32.96 kg/m²   Constitutional: vitals asabove: alert, appears stated age and cooperative  Psychiatric: normal insight and judgment, oriented to person, place, time, and general circumstances  Head: Normocephalic, without obvious abnormality, atraumatic  Eyes:lids and lashes normal, conjunctivae and sclerae normal and pupils equal, round, reactive to light and accomodation  EMNT: external ears normal, lips normal  Neck: no adenopathy, supple, symmetrical, trachea midline and thyroid not enlarged, symmetric, no tenderness/mass/nodules   Respiratory: clear to auscultation and percussion bilaterally with normal respiratory effort  Cardiovascular: normal rate, regular rhythm, normal S1 and S2 and no carotid bruits  Gastrointestinal: soft, non-tender, non-distended, normal bowel sounds, no masses or organomegaly  Lymphatic:   Extremities: no edema, no clubbing  Skin: No rashes or nodules noted.   Neurologic:    LABS:  Labs Reviewed   HEMOGLOBIN AND HEMATOCRIT, BLOOD   CBC WITH AUTO DIFFERENTIAL   BASIC METABOLIC PANEL   TYPE AND SCREEN    Narrative:     Performed at:  [facility] [address], [place], [address]   Phone [phone]         IMAGING:  Imaging has been reviewed in the computerized chart  Xr Thoracic Spine 1 Vw    Result Date: 10/20/2020  EXAMINATION: XRAY VIEWS OF THE THORACIC SPINE 10/20/2020 6:27 am COMPARISON: None. HISTORY: ORDERING SYSTEM PROVIDED HISTORY: needle placement in or TECHNOLOGIST PROVIDED HISTORY: Reason for exam:->needle placement in or Acuity: Unknown FINDINGS/IMPRESSION: Intra-procedural fluoroscopic imaging was performed and submitted for administrative purposes. Please see procedural report for details. EKG:           MEDICAL DECISION MAKING:    Principal Problem:    Spinal stenosis, thoracic region -New Problem to me. Doing well post op. Pain appears controlled. Has not yet worked with  therapy  Plan: Continue on post-operative pathway. PT/OT to see patient. Continue pain control - on IV pain meds acutely post op. Work on transitioning to oral pain meds when possible. Will follow serial h/h to monitor for acute blood loss anemia - labs ordered for tomorrow. Active Problems:    GERD (gastroesophageal reflux disease) - Established problem. Stable. Plan: denies reflux. Cont on PPI    Depression -Established problem. Stable. Mood stable  Plan: continue home meds    High cholesterol -Established problem. Stable. Plan: Continue present orders/plan. On statin,. Continue meds. Watch for myalgias        Diagnoses as listed above, designated as new or established and plan outlined for each. Data Reviewed:   (1) Lab tests were reviewed or ordered. (1) Radiology tests were reviewed or ordered. (1) Medical test (Echo, EKG, PFT/catracho) were not ordered. (1) History was not obtained from someone other than patient  (1) Old records  were reviewed - see HPI/MDM for pertinent details if review done. (2) Case was discussed with another health care provider: Dr Vandana Talbot  (2) Imaging was viewed by myself. (2) EKG  was not viewed by myself. Thanks for the consult! Will follow along daily while patient is in house.       Elvie Lacy  10/20/2020

## 2020-10-20 NOTE — OP NOTE
[facility] OR  10/20/2020 11:28 AM    PATIENT NAME:          Micaela Lam  YOB: 1940   MEDICAL RECORD#         [id number]  SURGERY DATE:         10/20/2020  SURGEON:                 Deja Carbajal                                                      OPERATIVE REPORT     PREOPERATIVE DIAGNOSIS:  1. Thoracic stenosis T10-11    POSTOPERATIVE DIAGNOSIS:  1.  same    PROCEDURE PERFORMED:  1.  Bilateral microscopic decompressive laminectomy T10-11 with bilateral medial facetectomies and  nerve root foraminotomies. 2. Evoked potential monitoring  3. Interoperative Flouroscopy    ANESTHESIA:  General oral endotracheal    ESTIMATED BLOOD LOSS: 100  cc. TUBES/DRAINS:  None. INDICATIONS:   Micaela Lam is a 79 y.o. male with intractable lower extremity weakness, failing to improve with nonsurgical modalities. The risks, benefits and alternatives of a microscopic lumbar decompression were discussed with the patient in the office and he has decided to proceed with surgery. She was offered continued conservative measures versus surgical intervention and elected to proceed with surgical intervention. DETAILS OF PROCEDURE:  The patient was brought to the operating room and underwent general anesthesia. The patient was rolled into the prone position with padding over all bony protuberances. Initial evoked potentials were obtained and monitored throughout the entire case. A localizing x-ray was taken. The back was scrubbed, prepped and draped in the usual sterile standard fashion. A midline linear incision was made. A Weitlaner retractor was used for exposure. A bilateral subperiosteal dissection was done to expose the spinous processes and lamina of T 10 and 11. Xray confirmed location. A  Irving retractor system was set into place.  A Leksell rongeur was used to remove the spinous processes of T 10 The Midas César AM-8 drill was used to first drill down the lamina on the right and than on the left at T10 and 11 and the operating microscope draped and brought into the operative field. Utilizing microdissection, I began to continue to do dissect with the Midas César AM-8 drill, thinning down the lamina such that I could identify the posterior cortical wall breakthrough and identified the epidural fat. Using the small and medium Cloward punch, I continued to remove bone and lamina. There was significant ligamentous and facet hypertrophy,  and I was able to decompress at the T10-11 level,  extensively doing mesial facetectomies and nerve root foraminotomies of both sides. I was able to palpate on both sides of the thecal sac, identify the T10-11 disk space. The sac was well decompressed. Final evoked potentials were unchanged from preop. I irrigated out all the thrombin Gelfoam, closed the fascia and skin with #0 and 2-0 Vicryl suture and a running 3-0 nylon. A dry sterile occlusive dressing was placed over the wound. The patient tolerated the procedure well. Counts reported to the surgeon being correct. The patient was extubated in the operating room and taken to the recovery room in satisfactory condition. I went out and spoke with the patient's family. In conformance with CMS regulations, I affirm I was present in the operating room during the entire procedure.     Danuta Stewart MD

## 2020-10-20 NOTE — BRIEF OP NOTE
Brief Postoperative Note      Patient: Ortega Perry  YOB: 1940  MRN: [id number]    Date of Procedure: 10/20/2020    Pre-Op Diagnosis: M48.04  THORACIC STENOSIS T1-T12    Post-Op Diagnosis: Same       Procedure(s):  DECOMPRESSIVE Arias Shore ([id number], [id number]) EVOKES    Surgeon(s):  Erin Lobo MD    Assistant:  First Assistant: Gemma Shepard    Anesthesia: General    Estimated Blood Loss (mL): less than 336     Complications: None    Specimens:   * No specimens in log *    Implants:  * No implants in log *      Drains:   Closed/Suction Drain Right Back Bulb 7 [place] (Active)       Urethral Catheter Straight-tip 14 fr (Active)       Findings: T10-11 severe stenosis    Electronically signed by Erin Lobo MD on 10/20/2020 at 11:22 AM

## 2020-10-21 PROBLEM — D62 ACUTE BLOOD LOSS AS CAUSE OF POSTOPERATIVE ANEMIA: Status: ACTIVE | Noted: 2020-10-21

## 2020-10-21 LAB
ANION GAP SERPL CALCULATED.3IONS-SCNC: 7 MMOL/L (ref 3–16)
BASOPHILS ABSOLUTE: 0 K/UL (ref 0–0.2)
BASOPHILS RELATIVE PERCENT: 0.1 %
BUN BLDV-MCNC: 13 MG/DL (ref 7–20)
CALCIUM SERPL-MCNC: 9 MG/DL (ref 8.3–10.6)
CHLORIDE BLD-SCNC: 101 MMOL/L (ref 99–110)
CO2: 27 MMOL/L (ref 21–32)
CREAT SERPL-MCNC: 0.7 MG/DL (ref 0.8–1.3)
EOSINOPHILS ABSOLUTE: 0 K/UL (ref 0–0.6)
EOSINOPHILS RELATIVE PERCENT: 0 %
GFR AFRICAN AMERICAN: >60
GFR NON-AFRICAN AMERICAN: >60
GLUCOSE BLD-MCNC: 165 MG/DL (ref 70–99)
HCT VFR BLD CALC: 36.6 % (ref 40.5–52.5)
HEMOGLOBIN: 12.4 G/DL (ref 13.5–17.5)
LYMPHOCYTES ABSOLUTE: 1.1 K/UL (ref 1–5.1)
LYMPHOCYTES RELATIVE PERCENT: 10.9 %
MCH RBC QN AUTO: 30.5 PG (ref 26–34)
MCHC RBC AUTO-ENTMCNC: 34 G/DL (ref 31–36)
MCV RBC AUTO: 89.8 FL (ref 80–100)
MONOCYTES ABSOLUTE: 0.6 K/UL (ref 0–1.3)
MONOCYTES RELATIVE PERCENT: 6.1 %
NEUTROPHILS ABSOLUTE: 8.6 K/UL (ref 1.7–7.7)
NEUTROPHILS RELATIVE PERCENT: 82.9 %
PDW BLD-RTO: 13.5 % (ref 12.4–15.4)
PLATELET # BLD: 231 K/UL (ref 135–450)
PMV BLD AUTO: 7 FL (ref 5–10.5)
POTASSIUM SERPL-SCNC: 4.3 MMOL/L (ref 3.5–5.1)
RBC # BLD: 4.07 M/UL (ref 4.2–5.9)
SODIUM BLD-SCNC: 135 MMOL/L (ref 136–145)
WBC # BLD: 10.4 K/UL (ref 4–11)

## 2020-10-21 PROCEDURE — 97161 PT EVAL LOW COMPLEX 20 MIN: CPT

## 2020-10-21 PROCEDURE — 2580000003 HC RX 258: Performed by: NEUROLOGICAL SURGERY

## 2020-10-21 PROCEDURE — G0378 HOSPITAL OBSERVATION PER HR: HCPCS

## 2020-10-21 PROCEDURE — 36415 COLL VENOUS BLD VENIPUNCTURE: CPT

## 2020-10-21 PROCEDURE — 97535 SELF CARE MNGMENT TRAINING: CPT

## 2020-10-21 PROCEDURE — 97116 GAIT TRAINING THERAPY: CPT

## 2020-10-21 PROCEDURE — 6370000000 HC RX 637 (ALT 250 FOR IP): Performed by: NEUROLOGICAL SURGERY

## 2020-10-21 PROCEDURE — 97165 OT EVAL LOW COMPLEX 30 MIN: CPT

## 2020-10-21 PROCEDURE — 80048 BASIC METABOLIC PNL TOTAL CA: CPT

## 2020-10-21 PROCEDURE — 97530 THERAPEUTIC ACTIVITIES: CPT

## 2020-10-21 PROCEDURE — 1200000000 HC SEMI PRIVATE

## 2020-10-21 PROCEDURE — 85025 COMPLETE CBC W/AUTO DIFF WBC: CPT

## 2020-10-21 RX ORDER — ASPIRIN 81 MG/1
81 TABLET ORAL DAILY
Qty: 1 TABLET | Refills: 0 | COMMUNITY
Start: 2020-10-26

## 2020-10-21 RX ORDER — ACETAMINOPHEN/DIPHENHYDRAMINE 500MG-25MG
1 TABLET ORAL NIGHTLY PRN
COMMUNITY

## 2020-10-21 RX ORDER — OXYCODONE HYDROCHLORIDE 5 MG/1
5 TABLET ORAL EVERY 6 HOURS PRN
Qty: 28 TABLET | Refills: 0 | Status: SHIPPED | OUTPATIENT
Start: 2020-10-21 | End: 2020-10-28

## 2020-10-21 RX ORDER — DIPHENHYDRAMINE HCL 25 MG
25 TABLET ORAL NIGHTLY PRN
Status: DISCONTINUED | OUTPATIENT
Start: 2020-10-21 | End: 2020-10-22 | Stop reason: HOSPADM

## 2020-10-21 RX ORDER — POLYETHYLENE GLYCOL 3350 17 G/17G
17 POWDER, FOR SOLUTION ORAL DAILY
Qty: 527 G | Refills: 0 | COMMUNITY
Start: 2020-10-22 | End: 2020-11-21

## 2020-10-21 RX ADMIN — GABAPENTIN 600 MG: 300 CAPSULE ORAL at 11:35

## 2020-10-21 RX ADMIN — ISOSORBIDE MONONITRATE 60 MG: 30 TABLET, EXTENDED RELEASE ORAL at 11:34

## 2020-10-21 RX ADMIN — Medication 10 ML: at 21:27

## 2020-10-21 RX ADMIN — OXYCODONE 10 MG: 5 TABLET ORAL at 15:18

## 2020-10-21 RX ADMIN — PRAVASTATIN SODIUM 80 MG: 80 TABLET ORAL at 21:27

## 2020-10-21 RX ADMIN — GABAPENTIN 600 MG: 300 CAPSULE ORAL at 15:19

## 2020-10-21 RX ADMIN — METOPROLOL SUCCINATE 25 MG: 25 TABLET, EXTENDED RELEASE ORAL at 21:27

## 2020-10-21 RX ADMIN — GABAPENTIN 600 MG: 300 CAPSULE ORAL at 21:27

## 2020-10-21 RX ADMIN — POLYETHYLENE GLYCOL 3350 17 G: 17 POWDER, FOR SOLUTION ORAL at 11:34

## 2020-10-21 RX ADMIN — METOPROLOL SUCCINATE 25 MG: 25 TABLET, EXTENDED RELEASE ORAL at 11:36

## 2020-10-21 RX ADMIN — OXYCODONE 5 MG: 5 TABLET ORAL at 21:27

## 2020-10-21 RX ADMIN — OXYCODONE 10 MG: 5 TABLET ORAL at 11:36

## 2020-10-21 RX ADMIN — OXYCODONE 5 MG: 5 TABLET ORAL at 04:30

## 2020-10-21 RX ADMIN — ESCITALOPRAM OXALATE 20 MG: 10 TABLET ORAL at 11:37

## 2020-10-21 ASSESSMENT — PAIN SCALES - GENERAL
PAINLEVEL_OUTOF10: 6
PAINLEVEL_OUTOF10: 0
PAINLEVEL_OUTOF10: 4
PAINLEVEL_OUTOF10: 0
PAINLEVEL_OUTOF10: 4

## 2020-10-21 NOTE — PROGRESS NOTES
Post-op Progress Note - Neurosurgery    Subjective:  Ervin Fields is a 79 y.o. male. Pain is controlled. Patient complains of incisional pain, denies leg/arm pain, denies headache, hoarseness of voice, difficulty swallowing, N/V. Objective:  Blood pressure 135/65, pulse 75, temperature 98 °F (36.7 °C), temperature source Oral, resp. rate 16, height 6' 4\" (1.93 m), weight 270 lb 12.8 oz (122.8 kg), SpO2 98 %. DRAIN/TUBE OUTPUT:  Closed/Suction Drain Right Back Bulb 7 [place]-Output (ml): 25 ml    In: 2090 [P.O.:480; I.V.:1610]  Out: 2195 [Urine:2000; Drains:195]    Physical Exam:  Incision:healing well  Motor:unchanged  Activity: Ambulating with SBA    Labs:  BMP:   Lab Results   Component Value Date    GLUCOSE 165 10/21/2020    CO2 27 10/21/2020    BUN 13 10/21/2020    CREATININE 0.7 10/21/2020    CALCIUM 9.0 10/21/2020     WBC/Hgb/Hct/Plts:  10.4/12.4/36.6/231 (10/21 0640)     Imaging:  Xrays: none    Assessment and Plan:  Principal Problem:    Spinal stenosis, thoracic region  Active Problems:    GERD (gastroesophageal reflux disease)    Depression    High cholesterol    Acute blood loss as cause of postoperative anemia    POD # 1 S/P thoracic laminectomy   OT/PT: Continue to advance activity. D/C patricia   Continue to mobile in halls  Disposition: Home likely tomorrow am  Internal Medicine consult per Dr. Del Brennan for medical management.     Henrik Johnson  10/21/2020

## 2020-10-21 NOTE — CARE COORDINATION
Discharge Planning Assessment  Rn/SW discharge planner met w/patient/family member to discuss reason for admission, current living situation, and potential needs at the time of discharge. Demographics/Insurance verified Yes    Current type of dwellin level home    Living arrangements:w/spouse    Level of function/support:independent w/all ADL's    PCP:Chun Larsen    Last Visit to PCP:w/in the month    DME:4 wheeled walker, cane, grab bars around toilet, grab bars in shower    Active with any community resources/agencies/skilled home care:stated no services in the home    Medication compliance issues: no issues    Financial issues that could impact healthcare:no issues    Transportation at time of d/c (Discussed w/pt/family that on the day of discharge home, tentative time of discharge will be between 10am and noon): spouse    Discussed and provided facilities of choice if transition to a skilled nursing facility is required a the time of discharge-N/A. Tentative discharge plan: Met w/pt and spouse per consult for dc planning. Pt stated he has needed DME at home. Anticipates no needs on d/c.     Electronically signed by SOM Blackwell  [phone]

## 2020-10-21 NOTE — PLAN OF CARE
Problem: Falls - Risk of:  Goal: Will remain free from falls  Description: Will remain free from falls  Outcome: Ongoing  Goal: Absence of physical injury  Description: Absence of physical injury  Outcome: Ongoing     Problem: Infection - Surgical Site:  Goal: Will show no infection signs and symptoms  Description: Will show no infection signs and symptoms  Outcome: Ongoing     Problem: Mobility - Impaired:  Goal: Mobility will improve to maximum level  Description: Mobility will improve to maximum level  Outcome: Ongoing  Goal: Able to ambulate independently  Description: Able to ambulate independently  Outcome: Ongoing  Goal: Compliance with physical therapy regimen will improve  Description: Compliance with physical therapy regimen will improve  Outcome: Ongoing  Goal: Able to perform range-of-motion exercises independently  Description: Able to perform range-of-motion exercises independently  Outcome: Ongoing     Problem: Pain:  Goal: Pain level will decrease  Description: Pain level will decrease  Outcome: Ongoing  Goal: Control of acute pain  Description: Control of acute pain  Outcome: Ongoing  Goal: Control of chronic pain  Description: Control of chronic pain  Outcome: Ongoing     Problem: Sensory Perception - Impaired:  Goal: Absence of restraint-related injury  Description: Sensory function intact, lower extremity  Outcome: Ongoing  Goal: Ability to demonstrate correct body alignment while lying, sitting, and standing will improve  Description: Ability to demonstrate correct body alignment while lying, sitting, and standing will improve  Outcome: Ongoing  Goal: Circulatory function of lower extremities is within specified parameters  Description: Circulatory function of lower extremities is within specified parameters  Outcome: Ongoing     Problem: Urinary Retention:  Goal: Urinary elimination within specified parameters  Description: Urinary elimination within specified parameters  Outcome: Ongoing  Goal: Ability to reestablish a normal urinary elimination pattern will improve - after catheter removal  Description: Ability to reestablish a normal urinary elimination pattern will improve - after catheter removal  Outcome: Ongoing  Goal: Absence of postvoid residual urine  Description: Absence of postvoid residual urine  Outcome: Ongoing     Problem: Venous Thromboembolism:  Goal: Will show no signs or symptoms of venous thromboembolism  Description: Will show no signs or symptoms of venous thromboembolism  Outcome: Ongoing  Goal: Absence of signs or symptoms of impaired coagulation  Description: Absence of signs or symptoms of impaired coagulation  Outcome: Ongoing     Problem: Pain:  Goal: Pain level will decrease  Description: Pain level will decrease  Outcome: Ongoing  Goal: Control of acute pain  Description: Control of acute pain  Outcome: Ongoing  Goal: Control of chronic pain  Description: Control of chronic pain  Outcome: Ongoing

## 2020-10-21 NOTE — PROGRESS NOTES
mobility. Treatment Diagnosis: Above deficits associated with decompressive laminectomy thoracic 10-11  Prognosis: Good  Decision Making: Low Complexity  OT Education: Precautions;OT Role;Plan of Care;Transfer Training  Patient Education: Patient requires reinforcement for spinal precautions as well as adhering to LUE NWB status  Barriers to Learning: impulsivity  REQUIRES OT FOLLOW UP: Yes  Activity Tolerance  Activity Tolerance: Patient Tolerated treatment well  Safety Devices  Safety Devices in place: Yes  Type of devices: All fall risk precautions in place;Call light within reach; Chair alarm in place; Left in chair;Patient at risk for falls;Nurse notified;Gait belt  Restraints  Initially in place: No           Patient Diagnosis(es): The encounter diagnosis was Preop testing. has a past medical history of CAD (coronary artery disease), Cancer ([facility] [address] Hyperlipidemia, Hypertension, and Sleep apnea. has a past surgical history that includes Cardiac surgery; back surgery; Bladder surgery; joint replacement; and LAMINECTOMY POSTERIOR THORACIC / LUMBAR (N/A, 10/20/2020).     Treatment Diagnosis: Above deficits associated with decompressive laminectomy thoracic 10-11      Restrictions  Restrictions/Precautions  Restrictions/Precautions: Fall Risk, Weight Bearing  Upper Extremity Weight Bearing Restrictions  Left Upper Extremity Weight Bearing: Non Weight Bearing  Required Braces or Orthoses  Left Upper Extremity Brace/Splint: Cast  Position Activity Restriction  Spinal Precautions: No Bending, No Lifting, No Twisting  Other position/activity restrictions: s/p DECOMPRESSIVE LAMINECTOMY [id number]; Acquired LUE fracture in ER on 10/14, now in a cast.    Subjective   General  Chart Reviewed: Yes  Patient assessed for rehabilitation services?: Yes  Additional Pertinent Hx: distal radius fracture 10/14, CAD, bladder CA, HTN, sleep apnea  Family / Caregiver Present: No  Diagnosis: s/p DECOMPRESSIVE LAMINECTOMY cane in front of his body when ambulating.   Toilet Transfers  Toilet - Technique: Ambulating  Equipment Used: Standard toilet  Toilet Transfer: Contact guard assistance  Toilet Transfers Comments: w/ quad cane  ADL  LE Dressing: (Demonstrated figure 4 position while seating, declined to doff socks at this time)  Toileting: Contact guard assistance(CGA for standing tolerance)  Tone RUE  RUE Tone: Normotonic  Tone LUE  LUE Tone: Normotonic  Coordination  Movements Are Fluid And Coordinated: Yes     Bed mobility  Comment: Pt seated EOB at start of sessio and in chair at end of session  Transfers  Sit to stand: Contact guard assistance(x1 from EOB, x1 from toilet)  Stand to sit: Contact guard assistance  Transfer Comments: Cues to not utilize LUE when reaching back for chair to sit due to NWB     Cognition  Overall Cognitive Status: WFL  Perception  Overall Perceptual Status: WFL     Sensation  Overall Sensation Status: WNL        LUE AROM (degrees)  LUE General AROM: Not tested due to NWB status  RUE AROM (degrees)  RUE AROM : WFL  LUE Strength  LUE Strength Comment: Not tested due to NWB status  RUE Strength  Gross RUE Strength: WFL              Plan   Plan  Times per week: 3-5x/wk  Times per day: Daily  Current Treatment Recommendations: Strengthening, ROM, Safety Education & Training, Pain Management, Self-Care / ADL      AM-[facility] Score     [facility] Inpatient Daily Activity Raw Score: 18 (10/21/20 1119)  AM-PAC Inpatient ADL T-Scale Score : 38.66 (10/21/20 1119)  ADL Inpatient CMS 0-100% Score: 46.65 (10/21/20 1119)  ADL Inpatient CMS G-Code Modifier : CK (10/21/20 1119)    Goals  Short term goals  Time Frame for Short term goals: discharge  Short term goal 1: Mod I UB/LB dressing adhering to NWB status  Short term goal 2: Mod I Bathing tasks  Short term goal 3: Mod I Shower transfer  Short term goal 4: Mod I Fxl transfers  Short term goal 5: Mod I Fxl mobiltiy  Long term goals  Time Frame for Long term goals : LTG=STG       Therapy Time   Individual Concurrent Group Co-treatment   Time In 1003         Time Out 1058         Minutes 55           Timed Code Treatment Minutes:  40 minutes    Total Treatment Minutes:  55 minutes     PAT Roberts  Therapist directly observed and directed this treatment.   Shivani Saucedo OTR/L [id number]    Ana Maria Chester OT

## 2020-10-21 NOTE — PROGRESS NOTES
Progress Note - Dr. Del Brennan - Internal Medicine  PCP: No primary care provider on file. No primary physician on file. None    Hospital Day: 1  Code Status: Full Code  Current Diet: DIET GENERAL;        CC: follow up on medical issues    Subjective:   Ervin Fields is a 79 y.o. male. He denies problems    Doing ok  sleeing comfortably  Has cpap on  Pain controlled    He denies chest pain, denies shortness of breath, denies nausea,  denies emesis. 10 system Review of Systems is reviewed with patient, and pertinent positives are noted in HPI above . Otherwise, Review of systems is negative. I have reviewed the patient's medical and social history in detail and updated the computerized patient record. To recap: He  has a past medical history of CAD (coronary artery disease), Cancer ([facility] [address] Hyperlipidemia, Hypertension, and Sleep apnea. . He  has a past surgical history that includes Cardiac surgery; back surgery; Bladder surgery; joint replacement; and LAMINECTOMY POSTERIOR THORACIC / LUMBAR (N/A, 10/20/2020). Maurice Perrin He  reports that he has quit smoking. He has never used smokeless tobacco. He reports previous alcohol use. He reports that he does not use drugs. .        Active Hospital Problems    Diagnosis Date Noted    Spinal stenosis, thoracic region [M48.04] 10/20/2020    GERD (gastroesophageal reflux disease) [K21.9] 10/20/2020    Depression [F32.9] 10/20/2020    High cholesterol [E78.00] 10/20/2020       Current Facility-Administered Medications: pravastatin (PRAVACHOL) tablet 80 mg, 80 mg, Oral, Nightly  metoprolol succinate (TOPROL XL) extended release tablet 25 mg, 25 mg, Oral, BID  isosorbide mononitrate (IMDUR) extended release tablet 60 mg, 60 mg, Oral, Daily  gabapentin (NEURONTIN) capsule 600 mg, 600 mg, Oral, TID  escitalopram (LEXAPRO) tablet 20 mg, 20 mg, Oral, Daily  dextrose 5 % and 0.45 % NaCl with KCl 20 mEq infusion, 1,000 mL, Intravenous, Continuous  sodium chloride flush 0.9 % injection 10 mL, 10 mL, Intravenous, 2 times per day  sodium chloride flush 0.9 % injection 10 mL, 10 mL, Intravenous, PRN  promethazine (PHENERGAN) tablet 12.5 mg, 12.5 mg, Oral, Q6H PRN **OR** ondansetron (ZOFRAN) injection 4 mg, 4 mg, Intravenous, Q6H PRN  oxyCODONE (ROXICODONE) immediate release tablet 5 mg, 5 mg, Oral, Q4H PRN **OR** oxyCODONE (ROXICODONE) immediate release tablet 10 mg, 10 mg, Oral, Q4H PRN  HYDROmorphone HCl PF (DILAUDID) injection 0.5 mg, 0.5 mg, Intravenous, Q3H PRN  methocarbamol (ROBAXIN) tablet 750 mg, 750 mg, Oral, Q8H PRN  polyethylene glycol (GLYCOLAX) packet 17 g, 17 g, Oral, Daily  bisacodyl (DULCOLAX) EC tablet 5 mg, 5 mg, Oral, Daily PRN  0.9 % sodium chloride bolus, 500 mL, Intravenous, PRN  potassium chloride (KLOR-CON M) extended release tablet 40 mEq, 40 mEq, Oral, PRN **OR** potassium bicarb-citric acid (EFFER-K) effervescent tablet 40 mEq, 40 mEq, Oral, PRN **OR** potassium chloride 10 mEq/100 mL IVPB (Peripheral Line), 10 mEq, Intravenous, PRN  labetalol (NORMODYNE;TRANDATE) injection 10 mg, 10 mg, Intravenous, Q6H PRN         Objective:  BP (!) 143/67   Pulse 68   Temp 98.7 °F (37.1 °C) (Oral)   Resp 16   Ht 6' 4\" (1.93 m)   Wt 270 lb 12.8 oz (122.8 kg)   SpO2 96%   BMI 32.96 kg/m²      Patient Vitals for the past 24 hrs:   BP Temp Temp src Pulse Resp SpO2 Height Weight   10/21/20 0350 (!) 143/67 98.7 °F (37.1 °C) Oral 68 16 96 % -- --   10/21/20 0327 -- -- -- -- 16 95 % -- --   10/20/20 2351 128/61 98.6 °F (37 °C) Oral 74 16 96 % -- --   10/20/20 2338 -- -- -- -- 16 95 % -- --   10/20/20 2000 134/68 98.1 °F (36.7 °C) Oral 80 16 94 % -- --   10/20/20 1830 137/69 98.1 °F (36.7 °C) Oral 80 16 -- -- --   10/20/20 1730 (!) 144/73 98.3 °F (36.8 °C) Oral 68 16 94 % -- --   10/20/20 1700 (!) 141/75 98.1 °F (36.7 °C) Oral 71 16 -- -- --   10/20/20 1632 -- -- -- -- 14 96 % -- --   10/20/20 1630 (!) 149/70 97.9 °F (36.6 °C) Oral 70 14 95 % -- --   10/20/20 1600 (!) 158/78 98 °F (36.7 °C) Oral 76 15 96 % -- --   10/20/20 1530 (!) 151/71 98 °F (36.7 °C) Oral 78 16 95 % -- --   10/20/20 1500 (!) 145/70 97.8 °F (36.6 °C) Oral 72 14 95 % -- --   10/20/20 1430 (!) 141/70 97.7 °F (36.5 °C) Oral 73 14 94 % -- --   10/20/20 1400 135/65 97.5 °F (36.4 °C) Axillary 75 12 94 % -- --   10/20/20 1330 126/62 -- -- 70 11 92 % -- --   10/20/20 1315 (!) 155/53 -- -- 68 11 94 % -- --   10/20/20 1300 (!) 148/59 -- -- 66 10 98 % -- --   10/20/20 1245 (!) 153/62 -- -- 65 9 98 % -- --   10/20/20 1230 (!) 147/52 -- -- 64 9 99 % -- --   10/20/20 1215 (!) 149/65 -- -- 65 9 99 % -- --   10/20/20 1200 (!) 146/65 97 °F (36.1 °C) Temporal 70 9 99 % -- --   10/20/20 1155 (!) 143/59 -- -- 74 11 98 % -- --   10/20/20 1150 (!) 151/64 -- -- 74 8 96 % -- --   10/20/20 1145 (!) 160/60 97.9 °F (36.6 °C) Temporal 74 12 96 % -- --   10/20/20 0846 (!) 99/48 97 °F (36.1 °C) Temporal 76 16 -- 6' 4\" (1.93 m) 270 lb 12.8 oz (122.8 kg)     Patient Vitals for the past 96 hrs (Last 3 readings):   Weight   10/20/20 0846 270 lb 12.8 oz (122.8 kg)           Intake/Output Summary (Last 24 hours) at 10/21/2020 0825  Last data filed at 10/21/2020 0412  Gross per 24 hour   Intake 3590 ml   Output 3395 ml   Net 195 ml         Physical Exam:   BP (!) 143/67   Pulse 68   Temp 98.7 °F (37.1 °C) (Oral)   Resp 16   Ht 6' 4\" (1.93 m)   Wt 270 lb 12.8 oz (122.8 kg)   SpO2 96%   BMI 32.96 kg/m²   General appearance: alert, appears stated age and cooperative  Head: Normocephalic, without obvious abnormality, atraumatic  Lungs: clear to auscultation bilaterally  Heart: regular rate and rhythm, S1, S2 normal, no murmur, click, rub or gallop  Abdomen: soft, non-tender; bowel sounds normal; no masses,  no organomegaly  Extremities: L arm in cast    Labs:  Lab Results   Component Value Date    WBC 10.4 10/21/2020    HGB 12.4 (L) 10/21/2020    HCT 36.6 (L) 10/21/2020     10/21/2020     (L) 10/21/2020    K 4.3 10/21/2020     10/21/2020    CREATININE 0.7 (L) 10/21/2020    BUN 13 10/21/2020    CO2 27 10/21/2020    INR 1.10 10/14/2020     No results found for: CKTOTAL, CKMB, CKMBINDEX, TROPONINI    Recent Imaging Results are Reviewed:  Xr Thoracic Spine 1 Vw    Result Date: 10/20/2020  EXAMINATION: XRAY VIEWS OF THE THORACIC SPINE 10/20/2020 6:27 am COMPARISON: None. HISTORY: ORDERING SYSTEM PROVIDED HISTORY: needle placement in or TECHNOLOGIST PROVIDED HISTORY: Reason for exam:->needle placement in or Acuity: Unknown FINDINGS/IMPRESSION: Intra-procedural fluoroscopic imaging was performed and submitted for administrative purposes. Please see procedural report for details. Assessment and Plan:  Principal Problem:    Spinal stenosis, thoracic region -Established problem. Stable. Doing well post op. Some mild anemia. Pain controlled  Plan: Continue on post-operative pathway. PT/OT to see patient. Continue pain control - on IV pain meds acutely post op. Work on transitioning to oral pain meds when possible. Will follow serial h/h to monitor for acute blood loss anemia - labs ordered for tomorrow. Active Problems:    GERD (gastroesophageal reflux disease) -Established problem. Stable. Plan: Continue present orders/plan. Stay on ppi    Depression -Established problem. Stable. Mood stable  Plan: Continue present orders/plan. High cholesterol -Established problem. Stable. Plan: Continue present orders/plan. Acute blood loss as cause of postoperative anemia -New Problem to me.  hgb 12.4  Plan: No indication for transfusion. Cont to monitor h/h to assess progression of anemia. Recommend ferrous sulfate or MVI as outpatient.              Pollo Sample  10/21/2020

## 2020-10-21 NOTE — PLAN OF CARE
independently  10/21/2020 0743 by Ashwini Santizo RN  Outcome: Ongoing  10/21/2020 0346 by Marilou Antoine RN  Outcome: Ongoing     Problem: Pain:  Goal: Pain level will decrease  Description: Pain level will decrease  10/21/2020 0743 by Ashwini Santizo RN  Outcome: Ongoing  10/21/2020 0346 by Marilou Antoine RN  Outcome: Ongoing  Goal: Control of acute pain  Description: Control of acute pain  10/21/2020 0743 by Ashwini Santizo RN  Outcome: Ongoing  10/21/2020 0346 by Marilou Antoine RN  Outcome: Ongoing  Goal: Control of chronic pain  Description: Control of chronic pain  10/21/2020 0743 by Ashwini Santizo RN  Outcome: Ongoing  10/21/2020 0346 by Marilou Antoine RN  Outcome: Ongoing     Problem: Sensory Perception - Impaired:  Goal: Absence of restraint-related injury  Description: Sensory function intact, lower extremity  10/21/2020 0743 by Ashwini Santizo RN  Outcome: Ongoing  10/21/2020 0346 by Marilou Antoine RN  Outcome: Ongoing  Goal: Ability to demonstrate correct body alignment while lying, sitting, and standing will improve  Description: Ability to demonstrate correct body alignment while lying, sitting, and standing will improve  10/21/2020 0743 by Ashwini Santizo RN  Outcome: Ongoing  10/21/2020 0346 by Marilou Antoine RN  Outcome: Ongoing  Goal: Circulatory function of lower extremities is within specified parameters  Description: Circulatory function of lower extremities is within specified parameters  10/21/2020 0743 by Ashwini Santizo RN  Outcome: Ongoing  10/21/2020 0346 by Marilou Antoine RN  Outcome: Ongoing     Problem: Urinary Retention:  Goal: Urinary elimination within specified parameters  Description: Urinary elimination within specified parameters  10/21/2020 0743 by Ashwini Santizo RN  Outcome: Ongoing  10/21/2020 0346 by Marilou Antoine RN  Outcome: Ongoing  Goal: Ability to reestablish a normal urinary elimination pattern will improve - after catheter removal  Description: Ability to reestablish a normal urinary elimination pattern will improve - after catheter removal  10/21/2020 0743 by Henrietta Monzon RN  Outcome: Ongoing  10/21/2020 0346 by Ok Pleitez RN  Outcome: Ongoing  Goal: Absence of postvoid residual urine  Description: Absence of postvoid residual urine  10/21/2020 0743 by Henrietta Monzon RN  Outcome: Ongoing  10/21/2020 0346 by Ok Pleitez RN  Outcome: Ongoing     Problem: Venous Thromboembolism:  Goal: Will show no signs or symptoms of venous thromboembolism  Description: Will show no signs or symptoms of venous thromboembolism  10/21/2020 0743 by Henrietta Monzon RN  Outcome: Ongoing  10/21/2020 0346 by Ok Pleitez RN  Outcome: Ongoing  Goal: Absence of signs or symptoms of impaired coagulation  Description: Absence of signs or symptoms of impaired coagulation  10/21/2020 0743 by Henrietta Monzon RN  Outcome: Ongoing  10/21/2020 0346 by Ok Pleitez RN  Outcome: Ongoing     Problem: Pain:  Goal: Pain level will decrease  Description: Pain level will decrease  10/21/2020 0743 by Henrietta Monzon RN  Outcome: Ongoing  10/21/2020 0346 by Ok Pleitez RN  Outcome: Ongoing  Goal: Control of acute pain  Description: Control of acute pain  10/21/2020 0743 by Henrietta Monzon RN  Outcome: Ongoing  10/21/2020 0346 by Ok Pleitez RN  Outcome: Ongoing  Goal: Control of chronic pain  Description: Control of chronic pain  10/21/2020 0743 by Henrietta Monzon RN  Outcome: Ongoing  10/21/2020 0346 by Ok Pleitez RN  Outcome: Ongoing

## 2020-10-21 NOTE — PROGRESS NOTES
Physical Therapy    Facility/Department: [address] ORTHO/NEURO NURSING  Initial Assessment    NAME: Juany Dee  : 1940  MRN: [id number]    Date of Service: 10/21/2020    Discharge Recommendations: Juany Dee scored a 19/24 on the AM-PAC short mobility form. Current research shows that an AM-PAC score of 18 or greater is typically associated with a discharge to the patient's home setting. Based on the patient's AM-PAC score and their current functional mobility deficits, it is recommended that the patient have 2-3 sessions per week of Physical Therapy at d/c to increase the patient's independence. At this time, this patient demonstrates the endurance and safety to discharge home with HHPT and a follow up treatment frequency of 2-3x/wk. Please see assessment section for further patient specific details. HOME HEALTH CARE: LEVEL 3 SAFETY     - Initial home health evaluation to occur within 24-48 hours, in patient home   - Therapy evaluations in home within 24-48 hours of discharge; including DME and home safety   - Frontload therapy 5 days, then 3x a week   - Therapy to evaluate if patient has [address] needs for personal care   -  evaluation within 24-48 hours, includes evaluation of resources and insurance to determine [place], [place], LTC, and Medicaid options   If patient discharges prior to next session this note will serve as a discharge summary. Please see below for the latest assessment towards goals. PT Equipment Recommendations  Equipment Needed: No  Other: Defer to next level of care    Assessment   Body structures, Functions, Activity limitations: Decreased functional mobility ; Decreased strength; Increased pain;Decreased balance;Decreased posture;Decreased endurance  Assessment: Pt presents with decreased transfer, ambulation, and stair ability compared to baseline PLOF. Pt ambulated 200 ft CGA using cane on R side due to LUE WB restriction.  Pt had loss of balance x2 with ambulation which required min assist to correct. Pt required v.c for proper cane ambulation in opposite hand for safety. Pt would benefit from skilled therapy services to improve functional mobility, and return to PLOF. Treatment Diagnosis: Decreased Functional Mobility  Prognosis: Good  Decision Making: Low Complexity  Clinical Presentation: Stable  PT Education: PT Role;Goals; General Safety;Gait Training;Plan of Care;Home Exercise Program  Patient Education: Pt agreeable to discharge recommendations. Pt continued to be educated on post op restrictions for back and LUE, may need reinforcement  Barriers to Learning: None  REQUIRES PT FOLLOW UP: Yes  Activity Tolerance  Activity Tolerance: Patient Tolerated treatment well  Activity Tolerance: Pt activity not limited by fatigue, pain or endurance. Reports mild discomfort on R low back but was not limiting       Patient Diagnosis(es): The encounter diagnosis was Preop testing. has a past medical history of CAD (coronary artery disease), Cancer ([facility] [address] Hyperlipidemia, Hypertension, and Sleep apnea. has a past surgical history that includes Cardiac surgery; back surgery; Bladder surgery; joint replacement; and LAMINECTOMY POSTERIOR THORACIC / LUMBAR (N/A, 10/20/2020).     Restrictions  Restrictions/Precautions  Restrictions/Precautions: Fall Risk, Weight Bearing  Upper Extremity Weight Bearing Restrictions  Left Upper Extremity Weight Bearing: Non Weight Bearing  Required Braces or Orthoses  Left Upper Extremity Brace/Splint: Cast  Position Activity Restriction  Spinal Precautions: No Bending, No Lifting, No Twisting  Other position/activity restrictions: s/p DECOMPRESSIVE LAMINECTOMY [id number]; Acquired LUE fracture in ER on 10/14, now in a cast.  Vision/Hearing  Vision: Impaired  Vision Exceptions: Wears glasses at all times  Hearing: Exceptions to [facility]  Hearing Exceptions: Hard of hearing/hearing concerns;Bilateral hearing aid Subjective  General  Chart Reviewed: Yes  Patient assessed for rehabilitation services?: Yes  Additional Pertinent Hx: GERD, Depression, High Cholesterol, HTN, CAD  Family / Caregiver Present: No  Diagnosis: Spinal Stenosis  Follows Commands: Within Functional Limits  Subjective  Subjective: Pt seated EOB, agreeable to therapy this visit  Pain Screening  Patient Currently in Pain: Yes(Simultaneous filing.  User may not have seen previous data.)   3/10 pain in back          Orientation  Orientation  Overall Orientation Status: Within Normal Limits  Social/Functional History  Social/Functional History  Lives With: Spouse  Type of Home: House  Home Layout: One level  Home Access: Stairs to enter without rails  Entrance Stairs - Number of Steps: 1 TANISHA  Bathroom Shower/Tub: Walk-in shower  Bathroom Toilet: Standard  Bathroom Equipment: Grab bars around toilet, Grab bars in [address] [place]: 4 wheeled walker, Cane  ADL Assistance: Independent  Homemaking Assistance: Needs assistance  Homemaking Responsibilities: Yes  Ambulation Assistance: Needs assistance(Quad Cane)  Transfer Assistance: Independent  Active : Yes  Mode of Transportation: [employer]  Occupation: Retired  Leisure & Hobbies: play with [employer], [employer], [employer] meetings  Additional Comments: 2 falls in past 6 months  Cognition        Objective     Observation/Palpation  Posture: Good    AROM RLE (degrees)  RLE AROM: WFL  AROM LLE (degrees)  LLE AROM : WFL  Strength RLE  Strength RLE: Exception  Comment: Decreased knee extension strength compared to L, 3/5  Strength LLE  Strength LLE: WFL     Sensation  Overall Sensation Status: WNL  Bed mobility  Comment: Pt seated EOB at start of session and in chair at end of session  Transfers  Sit to Stand: Contact guard assistance  Stand to sit: Contact guard assistance  Ambulation  Ambulation?: Yes  Ambulation 1  Surface: level tile  Device: Small Brenden Raghav  Assistance: Contact guard assistance;Minimal assistance(Pt required min assist for loss of balance x2 during ambulation, otherwise CGA)  Gait Deviations: Slow Leigh Ann; Shuffles  Distance: ~200'  Comments: Pt had loss of balance x2 with ambulation required min assist. Pt CGA with ambulation besides for loss of balance. Pt required minimal cues for cane in opposite arm due to LUE fracture. Stairs/Curb  Stairs?: Yes  Stairs  # Steps : 1  Stairs Height: 6\"  Rails: None  Curbs: 6\"  Device: Small Brenden Raghav  Assistance: Contact guard assistance  Comment: Pt demonstrates apprioprate ability to ambulate stair curb, similar to home enivronment. Balance  Sitting - Static: Good  Sitting - Dynamic: Good  Standing - Static: Fair  Standing - Dynamic: Fair  Comments: Pt had loss of balance x2 while ambulating with cane, required min assist with loss of balance. Pt states weakness on R LE diffucult to manage with cane on R due to LUE WB restrictions. Pt demonstrates ADL without LOB or correction in sitting and standing in bathroom. Plan   Plan  Times per week: 7  Times per day: Daily  Current Treatment Recommendations: Strengthening, Neuromuscular Re-education, IADL Training, Home Exercise Program, ROM, Cognitive/Perceptual Training, Manual Therapy - Soft Tissue Mobilization, Safety Education & Training, Balance Training, Endurance Training, Functional Mobility Training, Transfer Training, Gait Training, Modalities, Positioning, Pain Management, Stair training, ADL/Self-care Training  Safety Devices  Type of devices:  All fall risk precautions in place, Nurse notified, Chair alarm in place, Call light within reach, Patient at risk for falls, Left in chair  Restraints  Initially in place: No    G-Code       OutComes Score                                                  AM-PAC Score  AM-PAC Inpatient Mobility Raw Score : 19 (10/21/20 1122)  AM-PAC Inpatient T-Scale Score : 45.44 (10/21/20 1122)  Mobility Inpatient CMS 0-100% Score: 41.77 (10/21/20 1122)  Mobility Inpatient CMS G-Code Modifier : CK (10/21/20 1122)          Goals  Short term goals  Time Frame for Short term goals: Discharge  Short term goal 1: Pt will perform bed mobility MOD I  Short term goal 2: Pt will perform transfers with supervision  Short term goal 3: Pt will ambulate 300 ft with AAD and supervision  Short term goal 4: Pt will ascend/descend 1 curb stair with AAD and supervision       Therapy Time   Individual Concurrent Group Co-treatment   Time In 1003         Time Out 1058         Minutes 55         Timed Code Treatment Minutes: 40 Minutes     Terris Kussmaul, SPT    PT providing direct supervision during session and assisting in making skilled judgements throughout session.   [id number] [facility] PT, DPT [id number]    [id number] [facility], PT

## 2020-10-22 VITALS
SYSTOLIC BLOOD PRESSURE: 120 MMHG | TEMPERATURE: 98 F | BODY MASS INDEX: 32.98 KG/M2 | OXYGEN SATURATION: 97 % | HEART RATE: 76 BPM | RESPIRATION RATE: 16 BRPM | HEIGHT: 76 IN | WEIGHT: 270.8 LBS | DIASTOLIC BLOOD PRESSURE: 62 MMHG

## 2020-10-22 PROCEDURE — 94760 N-INVAS EAR/PLS OXIMETRY 1: CPT

## 2020-10-22 PROCEDURE — 6370000000 HC RX 637 (ALT 250 FOR IP): Performed by: INTERNAL MEDICINE

## 2020-10-22 PROCEDURE — 97116 GAIT TRAINING THERAPY: CPT

## 2020-10-22 PROCEDURE — 97530 THERAPEUTIC ACTIVITIES: CPT

## 2020-10-22 PROCEDURE — 6370000000 HC RX 637 (ALT 250 FOR IP): Performed by: NEUROLOGICAL SURGERY

## 2020-10-22 PROCEDURE — G0378 HOSPITAL OBSERVATION PER HR: HCPCS

## 2020-10-22 RX ADMIN — OXYCODONE 5 MG: 5 TABLET ORAL at 08:16

## 2020-10-22 RX ADMIN — OXYCODONE 10 MG: 5 TABLET ORAL at 04:36

## 2020-10-22 RX ADMIN — POLYETHYLENE GLYCOL 3350 17 G: 17 POWDER, FOR SOLUTION ORAL at 08:15

## 2020-10-22 RX ADMIN — METOPROLOL SUCCINATE 25 MG: 25 TABLET, EXTENDED RELEASE ORAL at 08:16

## 2020-10-22 RX ADMIN — ESCITALOPRAM OXALATE 20 MG: 10 TABLET ORAL at 08:16

## 2020-10-22 RX ADMIN — DIPHENHYDRAMINE HCL 25 MG: 25 TABLET ORAL at 01:59

## 2020-10-22 RX ADMIN — GABAPENTIN 600 MG: 300 CAPSULE ORAL at 08:15

## 2020-10-22 RX ADMIN — ISOSORBIDE MONONITRATE 60 MG: 30 TABLET, EXTENDED RELEASE ORAL at 08:16

## 2020-10-22 ASSESSMENT — PAIN SCALES - GENERAL
PAINLEVEL_OUTOF10: 0
PAINLEVEL_OUTOF10: 3
PAINLEVEL_OUTOF10: 2
PAINLEVEL_OUTOF10: 7
PAINLEVEL_OUTOF10: 2

## 2020-10-22 ASSESSMENT — PAIN DESCRIPTION - LOCATION
LOCATION: BACK
LOCATION: BACK

## 2020-10-22 ASSESSMENT — PAIN DESCRIPTION - PAIN TYPE
TYPE: SURGICAL PAIN
TYPE: SURGICAL PAIN

## 2020-10-22 ASSESSMENT — PAIN DESCRIPTION - PROGRESSION
CLINICAL_PROGRESSION: GRADUALLY IMPROVING
CLINICAL_PROGRESSION: GRADUALLY IMPROVING
CLINICAL_PROGRESSION: GRADUALLY WORSENING

## 2020-10-22 ASSESSMENT — PAIN DESCRIPTION - ORIENTATION
ORIENTATION: MID
ORIENTATION: MID

## 2020-10-22 ASSESSMENT — PAIN DESCRIPTION - FREQUENCY: FREQUENCY: CONTINUOUS

## 2020-10-22 NOTE — PLAN OF CARE
Problem: Falls - Risk of:  Goal: Will remain free from falls  Description: Will remain free from falls  10/22/2020 0946 by Edward Crews RN  Outcome: Completed  10/22/2020 0136 by Ambar Harding RN  Outcome: Ongoing  Goal: Absence of physical injury  Description: Absence of physical injury  10/22/2020 0946 by Edward Crews RN  Outcome: Completed  10/22/2020 0136 by Ambar Harding RN  Outcome: Ongoing     Problem: Discharge Planning:  Goal: Discharged to appropriate level of care  Description: Discharged to appropriate level of care  10/22/2020 0946 by Edward Cerws RN  Outcome: Completed  10/22/2020 0136 by Ambar Harding RN  Outcome: Ongoing     Problem: Cerebrospinal Fluid Leakage - Risk Of:  Goal: Absence of restraint indications  Description: Absence of cerebrospinal fluid drainage at surgical site  10/22/2020 0946 by Edward Crews RN  Outcome: Completed  10/22/2020 0136 by Ambar Harding RN  Outcome: Ongoing  Goal: Absence of postural headache  Description: Absence of postural headache  10/22/2020 0946 by Edward Crews RN  Outcome: Completed  10/22/2020 0136 by Ambar Harding RN  Outcome: Ongoing     Problem: Infection - Surgical Site:  Goal: Will show no infection signs and symptoms  Description: Will show no infection signs and symptoms  10/22/2020 0946 by Edward Crews RN  Outcome: Completed  10/22/2020 0136 by Ambar Harding RN  Outcome: Ongoing     Problem: Mobility - Impaired:  Goal: Mobility will improve to maximum level  Description: Mobility will improve to maximum level  10/22/2020 0946 by Edward Crews RN  Outcome: Completed  10/22/2020 0136 by Ambar Harding RN  Outcome: Ongoing  Goal: Able to ambulate independently  Description: Able to ambulate independently  10/22/2020 0946 by Edward Crews RN  Outcome: Completed  10/22/2020 0136 by Ambar Harding RN  Outcome: Ongoing  Goal: Compliance with physical therapy regimen will improve  Description: Compliance with physical therapy regimen will improve  10/22/2020 0946 by Richard Guardado RN  Outcome: Completed  10/22/2020 0136 by Ok Pleitez RN  Outcome: Ongoing  Goal: Able to perform range-of-motion exercises independently  Description: Able to perform range-of-motion exercises independently  10/22/2020 0946 by Richard Guardado RN  Outcome: Completed  10/22/2020 0136 by Ok Pleitez RN  Outcome: Ongoing     Problem: Pain:  Goal: Pain level will decrease  Description: Pain level will decrease  10/22/2020 0946 by Richard Guardado RN  Outcome: Completed  10/22/2020 0136 by Ok Pleitez RN  Outcome: Ongoing  Goal: Control of acute pain  Description: Control of acute pain  10/22/2020 0946 by Richard Guardado RN  Outcome: Completed  10/22/2020 0136 by Ok Pleitez RN  Outcome: Ongoing  Goal: Control of chronic pain  Description: Control of chronic pain  10/22/2020 0946 by Richard Guardado RN  Outcome: Completed  10/22/2020 0136 by Ok Pleitez RN  Outcome: Ongoing     Problem: Sensory Perception - Impaired:  Goal: Absence of restraint-related injury  Description: Sensory function intact, lower extremity  10/22/2020 0946 by Richard Guardado RN  Outcome: Completed  10/22/2020 0136 by Ok Pleitez RN  Outcome: Ongoing  Goal: Ability to demonstrate correct body alignment while lying, sitting, and standing will improve  Description: Ability to demonstrate correct body alignment while lying, sitting, and standing will improve  10/22/2020 0946 by Richard Guardado RN  Outcome: Completed  10/22/2020 0136 by Ok Pleitez RN  Outcome: Ongoing  Goal: Circulatory function of lower extremities is within specified parameters  Description: Circulatory function of lower extremities is within specified parameters  10/22/2020 0946 by Richard Guardado RN  Outcome: Completed  10/22/2020 0136 by Ok Pleitez RN  Outcome: Ongoing     Problem: Urinary Retention:  Goal: Urinary elimination within specified parameters  Description: Urinary elimination within specified parameters  10/22/2020 0946 by Lorenzo Bonilla RN  Outcome: Completed  10/22/2020 0136 by Julio C Pearce RN  Outcome: Ongoing  Goal: Ability to reestablish a normal urinary elimination pattern will improve - after catheter removal  Description: Ability to reestablish a normal urinary elimination pattern will improve - after catheter removal  10/22/2020 0946 by Lorenzo Bonilla RN  Outcome: Completed  10/22/2020 0136 by Julio C Pearce RN  Outcome: Ongoing  Goal: Absence of postvoid residual urine  Description: Absence of postvoid residual urine  10/22/2020 0946 by Lorenzo Bonilla RN  Outcome: Completed  10/22/2020 0136 by Julio C Pearce RN  Outcome: Ongoing     Problem: Venous Thromboembolism:  Goal: Will show no signs or symptoms of venous thromboembolism  Description: Will show no signs or symptoms of venous thromboembolism  10/22/2020 0946 by Lorenzo Bonilla RN  Outcome: Completed  10/22/2020 0136 by Julio C Pearce RN  Outcome: Ongoing  Goal: Absence of signs or symptoms of impaired coagulation  Description: Absence of signs or symptoms of impaired coagulation  10/22/2020 0946 by Lorenzo Bonilla RN  Outcome: Completed  10/22/2020 0136 by Julio C Pearce RN  Outcome: Ongoing     Problem: Pain:  Goal: Pain level will decrease  Description: Pain level will decrease  10/22/2020 0946 by Lorenzo Bonilla RN  Outcome: Completed  10/22/2020 0136 by Julio C Pearce RN  Outcome: Ongoing  Goal: Control of acute pain  Description: Control of acute pain  10/22/2020 0946 by Lorenzo Bonilla RN  Outcome: Completed  10/22/2020 0136 by Julio C Pearce RN  Outcome: Ongoing  Goal: Control of chronic pain  Description: Control of chronic pain  10/22/2020 0946 by Lorenzo Bonilla RN  Outcome: Completed  10/22/2020 0136 by Julio C Pearce RN  Outcome: Ongoing

## 2020-10-22 NOTE — PROGRESS NOTES
Physical Therapy  Facility/Department: [address] ORTHO/NEURO NURSING  Daily Treatment Note  NAME: Tan Recinos  : 1940  MRN: [id number]    Date of Service: 10/22/2020    Discharge Recommendations:Denis Hoyos scored a 21/24 on the AM-PAC short mobility form. Current research shows that an AM-PAC score of 18 or greater is typically associated with a discharge to the patient's home setting. Based on the patient's AM-PAC score and their current functional mobility deficits, it is recommended that the patient have 2-3 sessions per week of Physical Therapy at d/c to increase the patient's independence. At this time, this patient demonstrates the endurance and safety to discharge home with HHPT and a follow up treatment frequency of 2-3x/wk. Please see assessment section for further patient specific details. If patient discharges prior to next session this note will serve as a discharge summary. Please see below for the latest assessment towards goals. PT Equipment Recommendations  Equipment Needed: No    Assessment   Assessment: Pt presents with baseline level of transfer, ambulation and stair ability compared to PLOF. Pt demonstrates improved ability to ambulate with cane following LUE restrictions. Pt demonstrates 300ft of ambulation CGA with no LOB. Pt demonstrates no limiting factors in activity and mimimal discomfort in R low back while transfering. Pt demonstrates stair ability and car transfer ability for safe return to home environment. Pt would benefit from continued skilled PT services to allow for return to home environment. Treatment Diagnosis: Decreased Functional Mobility  Prognosis: Good  Decision Making: Low Complexity  Clinical Presentation: Stable  PT Education: PT Role;Goals; General Safety;Gait Training;Plan of Care;Home Exercise Program  Patient Education: Pt agreeable to discharge recommendations.  Pt continued to be educated on post op restrictions for back and LUE  Barriers to Learning: None  REQUIRES PT FOLLOW UP: Yes  Activity Tolerance  Activity Tolerance: Patient Tolerated treatment well  Activity Tolerance: Pt activity not limited by fatigue, pain or endurance. Reports mild discomfort on R low back but was not limiting     Patient Diagnosis(es): The primary encounter diagnosis was Preop testing. A diagnosis of Spinal stenosis, thoracic region was also pertinent to this visit. has a past medical history of CAD (coronary artery disease), Cancer ([facility] [address] Hyperlipidemia, Hypertension, and Sleep apnea. has a past surgical history that includes Cardiac surgery; back surgery; Bladder surgery; joint replacement; and LAMINECTOMY POSTERIOR THORACIC / LUMBAR (N/A, 10/20/2020). Restrictions  Restrictions/Precautions  Restrictions/Precautions: Fall Risk, Weight Bearing  Upper Extremity Weight Bearing Restrictions  Left Upper Extremity Weight Bearing: Weight Bearing As Tolerated  Required Braces or Orthoses  Left Upper Extremity Brace/Splint: Cast  Position Activity Restriction  Spinal Precautions: No Bending, No Lifting, No Twisting  Other position/activity restrictions: s/p DECOMPRESSIVE LAMINECTOMY [id number]; Acquired LUE fracture in ER on 10/14, now in a cast.  Subjective   General  Chart Reviewed:  Yes  Additional Pertinent Hx: GERD, Depression, High Cholesterol, HTN, CAD  Family / Caregiver Present: No  Subjective  Subjective: Pt supine in bed, agreeable to therapy  Pain Screening  Patient Currently in Pain: No  Pain Assessment  Clinical Progression: Gradually improving  Response to Pain Intervention: Patient Satisfied  Vital Signs  Patient Currently in Pain: No       Orientation  Orientation  Overall Orientation Status: Within Normal Limits  Cognition      Objective   Bed mobility  Comment: Pt seated EOB at start of session and in chair at end of session  Transfers  Sit to Stand: Supervision  Stand to sit: Supervision  Car Transfer: Supervision  Comment: Pt demonstrates apprioprate car transfer for safe return to home environment  Ambulation  Ambulation?: Yes  Ambulation 1  Surface: level tile  Device: Small Brenden Conyers  Assistance: Contact guard assistance  Gait Deviations: Slow Leigh Ann; Shuffles  Distance: ~200'  Comments: Pt demonstrates improved ambulation with CGA. No loss of balance during ambulation. minimal verbal cues given for cane positioning and use due to LUE fx on primary AD extremity  Stairs/Curb  Stairs?: Yes  Stairs  # Steps : 1  Device: Small Brenden Raghav  Assistance: Contact guard assistance  Comment: Pt demonstrates apprioprate ability to ambulate stair curb, similar to home environment     Balance  Sitting - Static: Good  Sitting - Dynamic: Good  Standing - Static: Good  Standing - Dynamic: Good  Comments: Pt had no LOB with ambulation with cane, Pt states that he can feel weakness on R and is improving use of cane in opposite UE due to fractue on LUE.       AROM RLE (degrees)  RLE AROM: WFL  AROM LLE (degrees)  LLE AROM : WFL  Strength RLE  Strength RLE: WFL  Comment: Pt demonstrates slight knee buckling with ambulation  Strength LLE  Strength LLE: WFL                 G-Code     OutComes Score                                                     AM-PAC Score  AM-PAC Inpatient Mobility Raw Score : 21 (10/22/20 1118)  AM-PAC Inpatient T-Scale Score : 50.25 (10/22/20 1118)  Mobility Inpatient CMS 0-100% Score: 28.97 (10/22/20 1118)  Mobility Inpatient CMS G-Code Modifier : CJ (10/22/20 1118)          Goals  Short term goals  Time Frame for Short term goals: Discharge  Short term goal 1: Pt will perform bed mobility MOD I  Short term goal 2: Pt will perform transfers with supervision- Goal Met 10/22  Short term goal 3: Pt will ambulate 300 ft with AAD and supervision   Short term goal 4: Pt will ascend/descend 1 curb stair with AAD and supervision- Goal Met 10/22  Short term goal 5: Pt will perform transfers with LRAD MOD I   Short term goal 6: Pt will negotiate curb step with LRAD MOD I   2 GOALS MET THIS DATE    Plan    Plan  Times per week: 7x  Times per day: Daily  Current Treatment Recommendations: Strengthening, Neuromuscular Re-education, IADL Training, Home Exercise Program, ROM, Cognitive/Perceptual Training, Manual Therapy - Soft Tissue Mobilization, Safety Education & Training, Balance Training, Endurance Training, Functional Mobility Training, Transfer Training, Gait Training, Modalities, Positioning, Pain Management, Stair training, ADL/Self-care Training  Safety Devices  Type of devices: All fall risk precautions in place, Nurse notified, Chair alarm in place, Call light within reach, Patient at risk for falls, Left in chair  Restraints  Initially in place: No     Therapy Time   Individual Concurrent Group Co-treatment   Time In 0905         Time Out 0931         Minutes 26         Timed Code Treatment Minutes: [address] [facility], [facility]    PT providing direct supervision during session and assisting in making skilled judgements throughout session.   [id number] [facility] PT, DPT [id number]    [id number] [facility], PT

## 2020-10-22 NOTE — PLAN OF CARE
Problem: Falls - Risk of:  Goal: Will remain free from falls  Description: Will remain free from falls  Outcome: Ongoing  Goal: Absence of physical injury  Description: Absence of physical injury  Outcome: Ongoing     Problem: Discharge Planning:  Goal: Discharged to appropriate level of care  Description: Discharged to appropriate level of care  Outcome: Ongoing     Problem: Cerebrospinal Fluid Leakage - Risk Of:  Goal: Absence of restraint indications  Description: Absence of cerebrospinal fluid drainage at surgical site  Outcome: Ongoing  Goal: Absence of postural headache  Description: Absence of postural headache  Outcome: Ongoing     Problem: Infection - Surgical Site:  Goal: Will show no infection signs and symptoms  Description: Will show no infection signs and symptoms  Outcome: Ongoing     Problem: Mobility - Impaired:  Goal: Mobility will improve to maximum level  Description: Mobility will improve to maximum level  Outcome: Ongoing  Goal: Able to ambulate independently  Description: Able to ambulate independently  Outcome: Ongoing  Goal: Compliance with physical therapy regimen will improve  Description: Compliance with physical therapy regimen will improve  Outcome: Ongoing  Goal: Able to perform range-of-motion exercises independently  Description: Able to perform range-of-motion exercises independently  Outcome: Ongoing     Problem: Pain:  Goal: Pain level will decrease  Description: Pain level will decrease  Outcome: Ongoing  Goal: Control of acute pain  Description: Control of acute pain  Outcome: Ongoing  Goal: Control of chronic pain  Description: Control of chronic pain  Outcome: Ongoing     Problem: Sensory Perception - Impaired:  Goal: Absence of restraint-related injury  Description: Sensory function intact, lower extremity  Outcome: Ongoing  Goal: Ability to demonstrate correct body alignment while lying, sitting, and standing will improve  Description: Ability to demonstrate correct body alignment while lying, sitting, and standing will improve  Outcome: Ongoing  Goal: Circulatory function of lower extremities is within specified parameters  Description: Circulatory function of lower extremities is within specified parameters  Outcome: Ongoing     Problem: Urinary Retention:  Goal: Urinary elimination within specified parameters  Description: Urinary elimination within specified parameters  Outcome: Ongoing  Goal: Ability to reestablish a normal urinary elimination pattern will improve - after catheter removal  Description: Ability to reestablish a normal urinary elimination pattern will improve - after catheter removal  Outcome: Ongoing  Goal: Absence of postvoid residual urine  Description: Absence of postvoid residual urine  Outcome: Ongoing     Problem: Venous Thromboembolism:  Goal: Will show no signs or symptoms of venous thromboembolism  Description: Will show no signs or symptoms of venous thromboembolism  Outcome: Ongoing  Goal: Absence of signs or symptoms of impaired coagulation  Description: Absence of signs or symptoms of impaired coagulation  Outcome: Ongoing     Problem: Pain:  Goal: Pain level will decrease  Description: Pain level will decrease  Outcome: Ongoing  Goal: Control of acute pain  Description: Control of acute pain  Outcome: Ongoing  Goal: Control of chronic pain  Description: Control of chronic pain  Outcome: Ongoing

## 2020-10-22 NOTE — PROGRESS NOTES
Shift assessment complete, see flowsheet. Patient in bed, no s/s of distress, respirations even and unlabored, VSS, dressing to mid back CDI, MICKEY drain in place with serosanguinous drainage, oxycodone 5 mg administered for moderate pain, repositioned for comfort, IS use encouraged. The care plan and education has been reviewed and mutually agreed upon with the patient. All needs attended. Fall precautions in place, call light within reach. Will continue to monitor.

## 2020-10-22 NOTE — DISCHARGE SUMMARY
Discharge Summary    Date of Admission: 10/20/2020  8:02 AM  Date of Discharge: 10/22/2020  Admission Diagnosis:   Patient Active Problem List   Diagnosis    Spinal stenosis, thoracic region    GERD (gastroesophageal reflux disease)    Depression    High cholesterol    Acute blood loss as cause of postoperative anemia     Discharge Diagnosis: Same   Condition on Discharge: good  Attending for Admission: Dr. Margareth Aquino  Procedures: 1. Bilateral microscopic decompressive laminectomy T10-11 with bilateral medial facetectomies and  nerve root foraminotomies. 2. Evoked potential monitoring  3. Interoperative Flouroscopy    Hospital Course: Thaddeus Morgan is a 79 y.o. male patient with intractable lower extremity weakness. Imaging showed thoracic stenosis. Failing to improve with nonsurgical modalities and weakness, He underwent the procedure listed above on date of admission. After surgery, His pre-operative radicular pain was improved. He complained of incisional pain. The pain was well-controlled on oral medications. The dressing was clean, dry and intact. There was no erythema or edema around the surgical site. Prior to discharge He was eating well, urinating and ambulating with a cane with PT/OT.      Discharge Vitals/Labs: BP (!) 147/69   Pulse 65   Temp 98 °F (36.7 °C) (Oral)   Resp 16   Ht 6' 4\" (1.93 m)   Wt 270 lb 12.8 oz (122.8 kg)   SpO2 95%   BMI 32.96 kg/m²   CBC with Differential:    Lab Results   Component Value Date    WBC 10.4 10/21/2020    RBC 4.07 10/21/2020    HGB 12.4 10/21/2020    HCT 36.6 10/21/2020     10/21/2020    MCV 89.8 10/21/2020    MCH 30.5 10/21/2020    MCHC 34.0 10/21/2020    RDW 13.5 10/21/2020    LYMPHOPCT 10.9 10/21/2020    MONOPCT 6.1 10/21/2020    BASOPCT 0.1 10/21/2020    MONOSABS 0.6 10/21/2020    LYMPHSABS 1.1 10/21/2020    EOSABS 0.0 10/21/2020    BASOSABS 0.0 10/21/2020     CMP:    Lab Results   Component Value Date     10/21/2020    K 4.3 10/21/2020    CL 101 10/21/2020    CO2 27 10/21/2020    BUN 13 10/21/2020    CREATININE 0.7 10/21/2020    GFRAA >60 10/21/2020    LABGLOM >60 10/21/2020    GLUCOSE 165 10/21/2020    CALCIUM 9.0 10/21/2020      Discharge Medications:      Medication List      START taking these medications    oxyCODONE 5 MG immediate release tablet  Commonly known as:  ROXICODONE  Take 1 tablet by mouth every 6 hours as needed for Pain for up to 7 days. polyethylene glycol 17 g packet  Commonly known as:  GLYCOLAX  Take 17 g by mouth daily        CHANGE how you take these medications    aspirin 81 MG EC tablet  Take 1 tablet by mouth daily  Start taking on:  October 26, 2020  What changed: These instructions start on October 26, 2020. If you are unsure what to do until then, ask your doctor or other care provider. CONTINUE taking these medications    gabapentin 600 MG tablet  Commonly known as:  NEURONTIN     isosorbide mononitrate 60 MG extended release tablet  Commonly known as:  IMDUR     Lexapro 20 MG tablet  Generic drug:  escitalopram     metoprolol succinate 25 MG extended release tablet  Commonly known as:  TOPROL XL     pantoprazole 20 MG tablet  Commonly known as:  PROTONIX     pravastatin 80 MG tablet  Commonly known as:  PRAVACHOL     Tylenol PM Extra Strength  MG tablet  Generic drug:  diphenhydrAMINE-APAP (sleep)           Where to Get Your Medications      You can get these medications from any pharmacy    Bring a paper prescription for each of these medications  · oxyCODONE 5 MG immediate release tablet  You don't need a prescription for these medications  · aspirin 81 MG EC tablet  · polyethylene glycol 17 g packet       Discharge Destination: The patient was discharged to Home. Follow-up: The patient is to follow-up with our office in the office in 2-3 weeks.    Discharge Instructions: Verbal and written discharge instructions as well as dressing change instructions were given to the patient at the time of

## 2020-10-22 NOTE — PROGRESS NOTES
Progress Note - Dr. Sarah Downs - Internal Medicine  PCP: No primary care provider on file. No primary physician on file. None    Hospital Day: 0  Code Status: Full Code  Current Diet: DIET GENERAL;        CC: follow up on medical issues    Subjective:   Ludmila Holcomb is a 79 y.o. male. He denies problems    Doing ok  Pain controlled  Case d/w Keshav Holly NP - anticipating home d/c later today    He denies chest pain, denies shortness of breath, denies nausea,  denies emesis. 10 system Review of Systems is reviewed with patient, and pertinent positives are noted in HPI above . Otherwise, Review of systems is negative. I have reviewed the patient's medical and social history in detail and updated the computerized patient record. To recap: He  has a past medical history of CAD (coronary artery disease), Cancer ([facility] [address] Hyperlipidemia, Hypertension, and Sleep apnea. . He  has a past surgical history that includes Cardiac surgery; back surgery; Bladder surgery; joint replacement; and LAMINECTOMY POSTERIOR THORACIC / LUMBAR (N/A, 10/20/2020). Amber Carrillo He  reports that he has quit smoking. He has never used smokeless tobacco. He reports previous alcohol use. He reports that he does not use drugs. .        Active Hospital Problems    Diagnosis Date Noted    Acute blood loss as cause of postoperative anemia [D62] 10/21/2020    Spinal stenosis, thoracic region [M48.04] 10/20/2020    GERD (gastroesophageal reflux disease) [K21.9] 10/20/2020    Depression [F32.9] 10/20/2020    High cholesterol [E78.00] 10/20/2020       Current Facility-Administered Medications: diphenhydrAMINE (BENADRYL) tablet 25 mg, 25 mg, Oral, Nightly PRN  pravastatin (PRAVACHOL) tablet 80 mg, 80 mg, Oral, Nightly  metoprolol succinate (TOPROL XL) extended release tablet 25 mg, 25 mg, Oral, BID  isosorbide mononitrate (IMDUR) extended release tablet 60 mg, 60 mg, Oral, Daily  gabapentin (NEURONTIN) capsule 600 mg, 600 mg, Oral, TID  escitalopram (LEXAPRO) Oral 65 16 98 %     Patient Vitals for the past 96 hrs (Last 3 readings):   Weight   10/20/20 0846 270 lb 12.8 oz (122.8 kg)           Intake/Output Summary (Last 24 hours) at 10/22/2020 0819  Last data filed at 10/22/2020 8482  Gross per 24 hour   Intake 720 ml   Output 1410 ml   Net -690 ml         Physical Exam:   /62   Pulse 76   Temp 98 °F (36.7 °C) (Axillary)   Resp 16   Ht 6' 4\" (1.93 m)   Wt 270 lb 12.8 oz (122.8 kg)   SpO2 97%   BMI 32.96 kg/m²   General appearance: alert, appears stated age and cooperative  Head: Normocephalic, without obvious abnormality, atraumatic  Lungs: clear to auscultation bilaterally  Heart: regular rate and rhythm, S1, S2 normal, no murmur, click, rub or gallop  Abdomen: soft, non-tender; bowel sounds normal; no masses,  no organomegaly  Extremities: L arm in cast    Labs:  Lab Results   Component Value Date    WBC 10.4 10/21/2020    HGB 12.4 (L) 10/21/2020    HCT 36.6 (L) 10/21/2020     10/21/2020     (L) 10/21/2020    K 4.3 10/21/2020     10/21/2020    CREATININE 0.7 (L) 10/21/2020    BUN 13 10/21/2020    CO2 27 10/21/2020    INR 1.10 10/14/2020     No results found for: CKTOTAL, CKMB, CKMBINDEX, TROPONINI    Recent Imaging Results are Reviewed:  Xr Thoracic Spine 1 Vw    Result Date: 10/20/2020  EXAMINATION: XRAY VIEWS OF THE THORACIC SPINE 10/20/2020 6:27 am COMPARISON: None. HISTORY: ORDERING SYSTEM PROVIDED HISTORY: needle placement in or TECHNOLOGIST PROVIDED HISTORY: Reason for exam:->needle placement in or Acuity: Unknown FINDINGS/IMPRESSION: Intra-procedural fluoroscopic imaging was performed and submitted for administrative purposes. Please see procedural report for details. Assessment and Plan:  Principal Problem:    Spinal stenosis, thoracic region -Established problem. Stable. Doing well post op. Some mild anemia. Pain controlled  Plan: Continue on post-operative pathway.   Will follow serial h/h to monitor for acute blood loss anemia   Active Problems:    GERD (gastroesophageal reflux disease) -Established problem. Stable. Plan: Continue present orders/plan. Stay on ppi    Depression -Established problem. Stable. Mood stable  Plan: Continue present orders/plan. High cholesterol -Established problem. Stable. Plan: Continue present orders/plan. Acute blood loss as cause of postoperative anemia - hgb 12.4  Plan: No indication for transfusion.      Disp - medically stable for d/c            Deanna Thompsoning  10/22/2020

## 2024-05-23 LAB
ALBUMIN: 4 G/DL (ref 3.5–5.7)
ALP BLD-CCNC: 48 U/L (ref 34–104)
ALT SERPL-CCNC: 21 U/L (ref 7–52)
AST SERPL-CCNC: 23 U/L (ref 13–39)
BILIRUB SERPL-MCNC: 0.5 MG/DL (ref 0.3–1)
BILIRUBIN DIRECT: 0.15 MG/DL
HBA1C MFR BLD: 6.6 % (ref 4–6)
LIPASE: 33 U/L (ref 11–82)
MEAN PLASMA GLUCOSE: 143 MG/DL (ref 68–126)
TOTAL PROTEIN: 6.5 G/DL (ref 6–8.3)
TROPONIN I, HIGH SENSITIVITY: 4 PG/ML

## 2024-05-24 LAB
CHOLESTEROL, TOTAL: 84 MG/DL
HDLC SERPL-MCNC: 27 MG/DL (ref 40–60)
LDL CHOLESTEROL: 26 MG/DL (ref 0–99)
TRIGL SERPL-MCNC: 157 MG/DL
TUBE #: NORMAL
VLDLC SERPL CALC-MCNC: 31 MG/DL (ref 0–40)

## 2024-07-22 LAB
Lab: NOT DETECTED
MAGNESIUM: 2 MG/DL (ref 1.6–2.4)
PROCALCITONIN: <0.05 NG/ML

## 2024-07-23 LAB
ANION GAP SERPL CALCULATED.3IONS-SCNC: 14 MMOL/L (ref 7–16)
BASOPHILS ABSOLUTE: 0 K/UL (ref 0–0.1)
BASOPHILS RELATIVE PERCENT: 0.9 %
BUN BLDV-MCNC: 13 MG/DL (ref 7–25)
CALCIUM SERPL-MCNC: 8.8 MG/DL (ref 8.6–10.2)
CHLORIDE BLD-SCNC: 102 MMOL/L (ref 98–107)
CO2: 23 MMOL/L (ref 21–31)
CREAT SERPL-MCNC: 0.78 MG/DL (ref 0.7–1.3)
EGFR MALE: 88 ML/MIN/1.73M2
EOSINOPHILS ABSOLUTE: 0.1 K/UL (ref 0–0.4)
EOSINOPHILS RELATIVE PERCENT: 2.3 %
GLUCOSE BLD-MCNC: 115 MG/DL (ref 74–109)
HCT VFR BLD CALC: 39.1 % (ref 39–51.5)
HEMOGLOBIN: 13.1 G/DL (ref 13.1–17.6)
LYMPHOCYTES ABSOLUTE: 1.9 K/UL (ref 0.8–3.6)
LYMPHOCYTES RELATIVE PERCENT: 33.7 %
MCH RBC QN AUTO: 30.9 PG (ref 28.4–33.4)
MCHC RBC AUTO-ENTMCNC: 33.5 G/DL (ref 31.1–37)
MCV RBC AUTO: 92 FL (ref 85–99)
MONOCYTES ABSOLUTE: 0.6 K/UL (ref 0.3–0.9)
MONOCYTES RELATIVE PERCENT: 10.5 %
NEUTROPHILS ABSOLUTE: 3 K/UL (ref 2–7.3)
NEUTROPHILS RELATIVE PERCENT: 52.6 %
PDW BLD-RTO: 13.9 % (ref 11.7–15.2)
PLATELET # BLD: 200 K/UL (ref 154–393)
POTASSIUM SERPL-SCNC: 3.7 MMOL/L (ref 3.5–5.1)
RBC # BLD: 4.25 M/UL (ref 4.3–5.86)
SODIUM BLD-SCNC: 139 MMOL/L (ref 136–145)
TROPONIN I, HIGH SENSITIVITY: 4 PG/ML
WBC # BLD: 5.7 K/UL (ref 4–10.5)

## 2025-03-11 LAB
ALBUMIN: 4 G/DL (ref 3.5–5.7)
ALP BLD-CCNC: 56 U/L (ref 34–104)
ALT SERPL-CCNC: 14 U/L (ref 7–52)
AST SERPL-CCNC: 16 U/L (ref 13–39)
BILIRUB SERPL-MCNC: 0.6 MG/DL (ref 0.3–1)
BILIRUBIN DIRECT: 0.1 MG/DL
LIPASE: 33 U/L (ref 11–82)
TOTAL PROTEIN: 6.2 G/DL (ref 6–8.3)
TROPONIN I, HIGH SENSITIVITY: 4 PG/ML
TROPONIN I, HIGH SENSITIVITY: 4 PG/ML

## 2025-03-14 LAB
BACTERIA, URINE: ABNORMAL /HPF
BILIRUBIN, URINE: NEGATIVE MG/DL
BLOOD, URINE: NEGATIVE MG/DL
CLARITY, UA: CLEAR
COLOR, UA: ABNORMAL
GLUCOSE URINE: NORMAL MG/DL
KETONES, URINE: NEGATIVE MG/DL
LEUKOCYTES, UA: 3 LEU/UL
MUCUS, URINE: ABNORMAL /LPF
NITRITE, URINE: NEGATIVE
PH, URINE: 7 PH (ref 5–8)
PROTEIN UA: NEGATIVE MG/DL
RBC URINE: ABNORMAL /HPF (ref 0–3)
SPECIFIC GRAVITY UA: 1.01 (ref 1–1.03)
SQUAMOUS EPITHELIAL: ABNORMAL /HPF (ref 0–3)
UROBILINOGEN, URINE: NORMAL MG/DL
WBC URINE: ABNORMAL /HPF (ref 0–3)

## (undated) DEVICE — SURE SET SINGLE BASIN-LF: Brand: MEDLINE INDUSTRIES, INC.

## (undated) DEVICE — Device

## (undated) DEVICE — SYMMETRY SHARP KERRISON® RONGEUR TIPS, THIN FOOTPLATE, 3 MM TIP, SINGLE USE, (3/BX): Brand: SYMMETRY SHARP KERRISON

## (undated) DEVICE — MASTISOL ADHESIVE LIQ 2/3ML

## (undated) DEVICE — DRAPE C ARM UNIV W41XL74IN CLR PLAS XR VELC CLSR POLY STRP

## (undated) DEVICE — COVER,MAYO STAND,XL,STERILE: Brand: MEDLINE

## (undated) DEVICE — DRAPE,LAP,CHOLE,W/TROUGHS,STERILE: Brand: MEDLINE

## (undated) DEVICE — SOLUTION IV IRRIG POUR BRL 0.9% SODIUM CHL 2F7124

## (undated) DEVICE — 3.0MM NEURO (MATCH HEAD)

## (undated) DEVICE — POSITIONER HD SFT TCH BERRY FOAM DEVON

## (undated) DEVICE — CATHETER,FOLEY,SILI-ELAST,LTX,14FR,10ML: Brand: MEDLINE

## (undated) DEVICE — ARM CRADLE: Brand: DEVON

## (undated) DEVICE — RONGEUR SURG KERRISON 2 MM SHRP THN FTPLT DISP

## (undated) DEVICE — GAUZE,SPONGE,4"X4",8PLY,STRL,LF,10/TRAY: Brand: MEDLINE

## (undated) DEVICE — CATHETER TRAY 16 FR 5 CC FOL ANTIREFLX SAMPLING PRT DOVER

## (undated) DEVICE — NEEDLE SPNL L3.5IN PNK HUB S STL REG WALL FIT STYL W/ QNCKE

## (undated) DEVICE — NEURO HBO 85949

## (undated) DEVICE — SUTURE VCRL SZ 2-0 L18IN ABSRB UD CT-1 L36MM 1/2 CIR J839D

## (undated) DEVICE — 3M™ STERI-STRIP™ REINFORCED ADHESIVE SKIN CLOSURES, R1547, 1/2 IN X 4 IN (12 MM X 100 MM), 6 STRIPS/ENVELOPE: Brand: 3M™ STERI-STRIP™

## (undated) DEVICE — MERCY FAIRFIELD TURNOVER KIT: Brand: MEDLINE INDUSTRIES, INC.

## (undated) DEVICE — TOWEL,OR,DSP,ST,BLUE,STD,4/PK,20PK/CS: Brand: MEDLINE

## (undated) DEVICE — SPONGES GAUZE X-RAY 4X4 16PLY

## (undated) DEVICE — SUTURE VCRL SZ 0 L18IN ABSRB UD L36MM CT-1 1/2 CIR J840D

## (undated) DEVICE — NEEDLE HYPO 22GA L1.5IN BLK POLYPR HUB S STL REG BVL STR

## (undated) DEVICE — STAPLER SKIN H3.9MM WIRE DIA0.58MM CRWN 6.9MM 35 STPL ROT

## (undated) DEVICE — BLADE CLIPPER GEN PURP NS

## (undated) DEVICE — 3M™ TEGADERM™ TRANSPARENT FILM DRESSING FRAME STYLE, 1628, 6 IN X 8 IN (15 CM X 20 CM), 10/CT 8CT/CASE: Brand: 3M™ TEGADERM™

## (undated) DEVICE — TRAY PREP DRY W/ PREM GLV 2 APPL 6 SPNG 2 UNDPD 1 OVERWRAP

## (undated) DEVICE — RONGEUR SURG KERRISON 4 MM SHRP THN FTPLT DISP

## (undated) DEVICE — BLANKET WRM W29.9XL79.1IN UP BODY FORC AIR MISTRAL-AIR

## (undated) DEVICE — STERILE LATEX POWDER-FREE SURGICAL GLOVESWITH NITRILE AND EMOLLIENT COATINGS: Brand: PROTEXIS

## (undated) DEVICE — DRAPE MICSCP W132XL406CM LENS DIA68MM W VARI LENS2 FOR LEICA

## (undated) DEVICE — SUTURE MCRYL SZ 4-0 L27IN ABSRB UD L19MM PS-2 1/2 CIR PRIM Y426H

## (undated) DEVICE — ELECTRODE PT RET AD L9FT HI MOIST COND ADH HYDRGEL CORDED